# Patient Record
Sex: FEMALE | Race: OTHER | HISPANIC OR LATINO | URBAN - METROPOLITAN AREA
[De-identification: names, ages, dates, MRNs, and addresses within clinical notes are randomized per-mention and may not be internally consistent; named-entity substitution may affect disease eponyms.]

---

## 2017-09-28 ENCOUNTER — EMERGENCY (EMERGENCY)
Facility: HOSPITAL | Age: 32
LOS: 1 days | Discharge: HOME | End: 2017-09-28
Attending: EMERGENCY MEDICINE | Admitting: EMERGENCY MEDICINE
Payer: SELF-PAY

## 2017-09-28 VITALS
RESPIRATION RATE: 18 BRPM | DIASTOLIC BLOOD PRESSURE: 78 MMHG | OXYGEN SATURATION: 97 % | TEMPERATURE: 99 F | HEART RATE: 77 BPM | SYSTOLIC BLOOD PRESSURE: 110 MMHG

## 2017-09-28 VITALS
RESPIRATION RATE: 16 BRPM | HEART RATE: 89 BPM | TEMPERATURE: 99 F | HEIGHT: 61 IN | SYSTOLIC BLOOD PRESSURE: 113 MMHG | OXYGEN SATURATION: 98 % | DIASTOLIC BLOOD PRESSURE: 74 MMHG | WEIGHT: 149.91 LBS

## 2017-09-28 DIAGNOSIS — N64.4 MASTODYNIA: ICD-10-CM

## 2017-09-28 DIAGNOSIS — Z79.899 OTHER LONG TERM (CURRENT) DRUG THERAPY: ICD-10-CM

## 2017-09-28 DIAGNOSIS — Z79.891 LONG TERM (CURRENT) USE OF OPIATE ANALGESIC: ICD-10-CM

## 2017-09-28 DIAGNOSIS — R50.9 FEVER, UNSPECIFIED: ICD-10-CM

## 2017-09-28 LAB
ALBUMIN SERPL ELPH-MCNC: 4.9 G/DL — SIGNIFICANT CHANGE UP (ref 3.3–5)
ALP SERPL-CCNC: 93 U/L — SIGNIFICANT CHANGE UP (ref 40–120)
ALT FLD-CCNC: 39 U/L — SIGNIFICANT CHANGE UP (ref 10–45)
ANION GAP SERPL CALC-SCNC: 15 MMOL/L — SIGNIFICANT CHANGE UP (ref 5–17)
AST SERPL-CCNC: 35 U/L — SIGNIFICANT CHANGE UP (ref 10–40)
BASOPHILS NFR BLD AUTO: 0.4 % — SIGNIFICANT CHANGE UP (ref 0–2)
BILIRUB SERPL-MCNC: 0.3 MG/DL — SIGNIFICANT CHANGE UP (ref 0.2–1.2)
BUN SERPL-MCNC: 11 MG/DL — SIGNIFICANT CHANGE UP (ref 7–23)
CALCIUM SERPL-MCNC: 10 MG/DL — SIGNIFICANT CHANGE UP (ref 8.4–10.5)
CHLORIDE SERPL-SCNC: 101 MMOL/L — SIGNIFICANT CHANGE UP (ref 96–108)
CO2 SERPL-SCNC: 23 MMOL/L — SIGNIFICANT CHANGE UP (ref 22–31)
CREAT SERPL-MCNC: 0.57 MG/DL — SIGNIFICANT CHANGE UP (ref 0.5–1.3)
EOSINOPHIL NFR BLD AUTO: 1.1 % — SIGNIFICANT CHANGE UP (ref 0–6)
GLUCOSE SERPL-MCNC: 127 MG/DL — HIGH (ref 70–99)
HCT VFR BLD CALC: 44.6 % — SIGNIFICANT CHANGE UP (ref 34.5–45)
HGB BLD-MCNC: 14.7 G/DL — SIGNIFICANT CHANGE UP (ref 11.5–15.5)
LYMPHOCYTES # BLD AUTO: 27.2 % — SIGNIFICANT CHANGE UP (ref 13–44)
MCHC RBC-ENTMCNC: 28.3 PG — SIGNIFICANT CHANGE UP (ref 27–34)
MCHC RBC-ENTMCNC: 33 G/DL — SIGNIFICANT CHANGE UP (ref 32–36)
MCV RBC AUTO: 85.8 FL — SIGNIFICANT CHANGE UP (ref 80–100)
MONOCYTES NFR BLD AUTO: 6.2 % — SIGNIFICANT CHANGE UP (ref 2–14)
NEUTROPHILS NFR BLD AUTO: 65.1 % — SIGNIFICANT CHANGE UP (ref 43–77)
PLATELET # BLD AUTO: 354 K/UL — SIGNIFICANT CHANGE UP (ref 150–400)
POTASSIUM SERPL-MCNC: 4 MMOL/L — SIGNIFICANT CHANGE UP (ref 3.5–5.3)
POTASSIUM SERPL-SCNC: 4 MMOL/L — SIGNIFICANT CHANGE UP (ref 3.5–5.3)
PROT SERPL-MCNC: 7.9 G/DL — SIGNIFICANT CHANGE UP (ref 6–8.3)
RBC # BLD: 5.2 M/UL — SIGNIFICANT CHANGE UP (ref 3.8–5.2)
RBC # FLD: 12.9 % — SIGNIFICANT CHANGE UP (ref 10.3–16.9)
SODIUM SERPL-SCNC: 139 MMOL/L — SIGNIFICANT CHANGE UP (ref 135–145)
WBC # BLD: 5.7 K/UL — SIGNIFICANT CHANGE UP (ref 3.8–10.5)
WBC # FLD AUTO: 5.7 K/UL — SIGNIFICANT CHANGE UP (ref 3.8–10.5)

## 2017-09-28 PROCEDURE — 80053 COMPREHEN METABOLIC PANEL: CPT

## 2017-09-28 PROCEDURE — 87040 BLOOD CULTURE FOR BACTERIA: CPT

## 2017-09-28 PROCEDURE — 85025 COMPLETE CBC W/AUTO DIFF WBC: CPT

## 2017-09-28 PROCEDURE — 76641 ULTRASOUND BREAST COMPLETE: CPT | Mod: 26,LT

## 2017-09-28 PROCEDURE — 36415 COLL VENOUS BLD VENIPUNCTURE: CPT

## 2017-09-28 PROCEDURE — 96374 THER/PROPH/DIAG INJ IV PUSH: CPT

## 2017-09-28 PROCEDURE — 76641 ULTRASOUND BREAST COMPLETE: CPT

## 2017-09-28 PROCEDURE — 99284 EMERGENCY DEPT VISIT MOD MDM: CPT | Mod: 25

## 2017-09-28 PROCEDURE — 99285 EMERGENCY DEPT VISIT HI MDM: CPT

## 2017-09-28 RX ORDER — CEPHALEXIN 500 MG
500 CAPSULE ORAL ONCE
Qty: 0 | Refills: 0 | Status: COMPLETED | OUTPATIENT
Start: 2017-09-28 | End: 2017-09-28

## 2017-09-28 RX ORDER — ACETAMINOPHEN WITH CODEINE 300MG-30MG
1 TABLET ORAL
Qty: 6 | Refills: 0
Start: 2017-09-28 | End: 2017-10-04

## 2017-09-28 RX ORDER — CEPHALEXIN 500 MG
1 CAPSULE ORAL
Qty: 28 | Refills: 0 | OUTPATIENT
Start: 2017-09-28 | End: 2017-10-05

## 2017-09-28 RX ORDER — SODIUM CHLORIDE 9 MG/ML
500 INJECTION INTRAMUSCULAR; INTRAVENOUS; SUBCUTANEOUS ONCE
Qty: 0 | Refills: 0 | Status: COMPLETED | OUTPATIENT
Start: 2017-09-28 | End: 2017-09-28

## 2017-09-28 RX ORDER — ACETAMINOPHEN 500 MG
650 TABLET ORAL ONCE
Qty: 0 | Refills: 0 | Status: COMPLETED | OUTPATIENT
Start: 2017-09-28 | End: 2017-09-28

## 2017-09-28 RX ORDER — KETOROLAC TROMETHAMINE 30 MG/ML
30 SYRINGE (ML) INJECTION ONCE
Qty: 0 | Refills: 0 | Status: DISCONTINUED | OUTPATIENT
Start: 2017-09-28 | End: 2017-09-28

## 2017-09-28 RX ADMIN — Medication 30 MILLIGRAM(S): at 15:39

## 2017-09-28 RX ADMIN — SODIUM CHLORIDE 1000 MILLILITER(S): 9 INJECTION INTRAMUSCULAR; INTRAVENOUS; SUBCUTANEOUS at 15:08

## 2017-09-28 RX ADMIN — Medication 650 MILLIGRAM(S): at 16:02

## 2017-09-28 RX ADMIN — Medication 30 MILLIGRAM(S): at 15:09

## 2017-09-28 RX ADMIN — Medication 650 MILLIGRAM(S): at 15:32

## 2017-09-28 RX ADMIN — Medication 500 MILLIGRAM(S): at 18:26

## 2017-09-28 NOTE — ED PROVIDER NOTE - OBJECTIVE STATEMENT
33 yo female in the ER with left breast pain, slight localized redness. Pt mentioned that she felt lump in her breast few days ago, and that area became more and more painful. Pt also mentioned that yesterday she had slight pus drainage from that pimple? and she had fever yesterday and today. pt is concerned because breast is painful as well as she has pain now under her left arm. Pt denies discharge from her nipples, denies any prior infection or pain in her breasts.

## 2017-09-28 NOTE — ED ADULT TRIAGE NOTE - ARRIVAL INFO ADDITIONAL COMMENTS
Prior tx Motrin effective for fever. Left breast near nipple has a raised lump and small amount of redness noted. Denies any numbness or tingling.

## 2017-09-28 NOTE — ED ADULT NURSE NOTE - OBJECTIVE STATEMENT
c/o left breast pain with redness to site, under right nipple. Pt also reported having fever since last night and this morning. Pt noted purulent drain to site.

## 2017-09-28 NOTE — ED PROVIDER NOTE - SKIN, MLM
Skin normal color for race, warm, dry and intact. No evidence of rash.  small localized erythema and tenderness to the left breast, abnbout 4 o'clock, no palpable fluctuance or fluid collection, no drainage

## 2017-09-28 NOTE — ED PROVIDER NOTE - ATTENDING CONTRIBUTION TO CARE
33 yo with erythema to L breast, no drainage, had subjective fever over the past few days. no cp/sob, hx of malignancy. Agree w PA exam as above including NAD, RRR, CTAB, no abdominal tenderness, no focal neuro deficits. US reviewed, appearing clinically as mastitis vs cellulitis, given subjective fever, will cover with PO abx, strict return prec given.

## 2017-09-28 NOTE — ED PROVIDER NOTE - MEDICAL DECISION MAKING DETAILS
33 yo female with pain to her left breast, slight localized erythema and superficial induration, no drainage,had fever yesterday and today. Labs- no leukocytosis, breast sono- no abscess, no mass, no cysts. D/c home with PO abx and put pt f/u with breast specialist within a week, most likely repeat US.

## 2017-10-03 LAB
CULTURE RESULTS: SIGNIFICANT CHANGE UP
CULTURE RESULTS: SIGNIFICANT CHANGE UP
SPECIMEN SOURCE: SIGNIFICANT CHANGE UP
SPECIMEN SOURCE: SIGNIFICANT CHANGE UP

## 2017-11-01 ENCOUNTER — INPATIENT (INPATIENT)
Facility: HOSPITAL | Age: 32
LOS: 0 days | Discharge: HOME | DRG: 941 | End: 2017-11-02
Attending: OBSTETRICS & GYNECOLOGY | Admitting: OBSTETRICS & GYNECOLOGY
Payer: MEDICAID

## 2017-11-01 VITALS
OXYGEN SATURATION: 98 % | TEMPERATURE: 99 F | HEART RATE: 87 BPM | DIASTOLIC BLOOD PRESSURE: 74 MMHG | WEIGHT: 149.91 LBS | SYSTOLIC BLOOD PRESSURE: 119 MMHG | RESPIRATION RATE: 16 BRPM

## 2017-11-01 LAB
ALBUMIN SERPL ELPH-MCNC: 4.2 G/DL — SIGNIFICANT CHANGE UP (ref 3.3–5)
ALP SERPL-CCNC: 120 U/L — SIGNIFICANT CHANGE UP (ref 40–120)
ALT FLD-CCNC: 83 U/L — HIGH (ref 10–45)
ANION GAP SERPL CALC-SCNC: 13 MMOL/L — SIGNIFICANT CHANGE UP (ref 5–17)
APPEARANCE UR: (no result)
AST SERPL-CCNC: 86 U/L — HIGH (ref 10–40)
BASOPHILS NFR BLD AUTO: 0.3 % — SIGNIFICANT CHANGE UP (ref 0–2)
BILIRUB SERPL-MCNC: 0.3 MG/DL — SIGNIFICANT CHANGE UP (ref 0.2–1.2)
BILIRUB UR-MCNC: NEGATIVE — SIGNIFICANT CHANGE UP
BUN SERPL-MCNC: 4 MG/DL — LOW (ref 7–23)
CALCIUM SERPL-MCNC: 9.1 MG/DL — SIGNIFICANT CHANGE UP (ref 8.4–10.5)
CHLORIDE SERPL-SCNC: 102 MMOL/L — SIGNIFICANT CHANGE UP (ref 96–108)
CO2 SERPL-SCNC: 24 MMOL/L — SIGNIFICANT CHANGE UP (ref 22–31)
COLOR SPEC: YELLOW — SIGNIFICANT CHANGE UP
CREAT SERPL-MCNC: 0.52 MG/DL — SIGNIFICANT CHANGE UP (ref 0.5–1.3)
DIFF PNL FLD: NEGATIVE — SIGNIFICANT CHANGE UP
EOSINOPHIL NFR BLD AUTO: 0.7 % — SIGNIFICANT CHANGE UP (ref 0–6)
GLUCOSE SERPL-MCNC: 102 MG/DL — HIGH (ref 70–99)
GLUCOSE UR QL: NEGATIVE — SIGNIFICANT CHANGE UP
HCT VFR BLD CALC: 38.8 % — SIGNIFICANT CHANGE UP (ref 34.5–45)
HGB BLD-MCNC: 13.5 G/DL — SIGNIFICANT CHANGE UP (ref 11.5–15.5)
KETONES UR-MCNC: NEGATIVE — SIGNIFICANT CHANGE UP
LEUKOCYTE ESTERASE UR-ACNC: NEGATIVE — SIGNIFICANT CHANGE UP
LYMPHOCYTES # BLD AUTO: 25.1 % — SIGNIFICANT CHANGE UP (ref 13–44)
MCHC RBC-ENTMCNC: 29.5 PG — SIGNIFICANT CHANGE UP (ref 27–34)
MCHC RBC-ENTMCNC: 34.8 G/DL — SIGNIFICANT CHANGE UP (ref 32–36)
MCV RBC AUTO: 84.7 FL — SIGNIFICANT CHANGE UP (ref 80–100)
MONOCYTES NFR BLD AUTO: 5.7 % — SIGNIFICANT CHANGE UP (ref 2–14)
NEUTROPHILS NFR BLD AUTO: 68.2 % — SIGNIFICANT CHANGE UP (ref 43–77)
NITRITE UR-MCNC: NEGATIVE — SIGNIFICANT CHANGE UP
PH UR: 7.5 — SIGNIFICANT CHANGE UP (ref 5–8)
PLATELET # BLD AUTO: 298 K/UL — SIGNIFICANT CHANGE UP (ref 150–400)
POTASSIUM SERPL-MCNC: 3.8 MMOL/L — SIGNIFICANT CHANGE UP (ref 3.5–5.3)
POTASSIUM SERPL-SCNC: 3.8 MMOL/L — SIGNIFICANT CHANGE UP (ref 3.5–5.3)
PROT SERPL-MCNC: 7.5 G/DL — SIGNIFICANT CHANGE UP (ref 6–8.3)
PROT UR-MCNC: NEGATIVE MG/DL — SIGNIFICANT CHANGE UP
RBC # BLD: 4.58 M/UL — SIGNIFICANT CHANGE UP (ref 3.8–5.2)
RBC # FLD: 12.8 % — SIGNIFICANT CHANGE UP (ref 10.3–16.9)
SODIUM SERPL-SCNC: 139 MMOL/L — SIGNIFICANT CHANGE UP (ref 135–145)
SP GR SPEC: 1.01 — SIGNIFICANT CHANGE UP (ref 1–1.03)
UROBILINOGEN FLD QL: 0.2 E.U./DL — SIGNIFICANT CHANGE UP
WBC # BLD: 6.9 K/UL — SIGNIFICANT CHANGE UP (ref 3.8–10.5)
WBC # FLD AUTO: 6.9 K/UL — SIGNIFICANT CHANGE UP (ref 3.8–10.5)

## 2017-11-01 PROCEDURE — 99285 EMERGENCY DEPT VISIT HI MDM: CPT

## 2017-11-01 PROCEDURE — 76817 TRANSVAGINAL US OBSTETRIC: CPT | Mod: 26

## 2017-11-01 PROCEDURE — 76770 US EXAM ABDO BACK WALL COMP: CPT | Mod: 26

## 2017-11-01 PROCEDURE — 76801 OB US < 14 WKS SINGLE FETUS: CPT | Mod: 26

## 2017-11-01 RX ORDER — ONDANSETRON 8 MG/1
4 TABLET, FILM COATED ORAL ONCE
Qty: 0 | Refills: 0 | Status: DISCONTINUED | OUTPATIENT
Start: 2017-11-01 | End: 2017-11-01

## 2017-11-01 RX ORDER — ACETAMINOPHEN 500 MG
650 TABLET ORAL ONCE
Qty: 0 | Refills: 0 | Status: COMPLETED | OUTPATIENT
Start: 2017-11-01 | End: 2017-11-01

## 2017-11-01 RX ORDER — SODIUM CHLORIDE 9 MG/ML
1000 INJECTION INTRAMUSCULAR; INTRAVENOUS; SUBCUTANEOUS ONCE
Qty: 0 | Refills: 0 | Status: COMPLETED | OUTPATIENT
Start: 2017-11-01 | End: 2017-11-01

## 2017-11-01 RX ORDER — MORPHINE SULFATE 50 MG/1
2 CAPSULE, EXTENDED RELEASE ORAL ONCE
Qty: 0 | Refills: 0 | Status: DISCONTINUED | OUTPATIENT
Start: 2017-11-01 | End: 2017-11-01

## 2017-11-01 RX ADMIN — SODIUM CHLORIDE 2000 MILLILITER(S): 9 INJECTION INTRAMUSCULAR; INTRAVENOUS; SUBCUTANEOUS at 20:02

## 2017-11-01 RX ADMIN — SODIUM CHLORIDE 2000 MILLILITER(S): 9 INJECTION INTRAMUSCULAR; INTRAVENOUS; SUBCUTANEOUS at 17:22

## 2017-11-01 RX ADMIN — Medication 650 MILLIGRAM(S): at 18:00

## 2017-11-01 RX ADMIN — MORPHINE SULFATE 2 MILLIGRAM(S): 50 CAPSULE, EXTENDED RELEASE ORAL at 23:59

## 2017-11-01 RX ADMIN — Medication 650 MILLIGRAM(S): at 17:22

## 2017-11-01 RX ADMIN — MORPHINE SULFATE 2 MILLIGRAM(S): 50 CAPSULE, EXTENDED RELEASE ORAL at 21:52

## 2017-11-01 NOTE — ED ADULT NURSE NOTE - OBJECTIVE STATEMENT
pt presents to ED A&Ox3 c/o LUQ pain radiating to left flank and LLQ starting Sunday. pt also reports fever 102 yesterday and vaginal spotting x few days. denies cp/sob, n/v/d, back pain, headache, cough, dysuria, burning urination. LMP 9/19/17 per pt states "my period is usually late."

## 2017-11-01 NOTE — ED PROVIDER NOTE - ATTENDING CONTRIBUTION TO CARE
32 F w LLQ pain/LUQ pain- since Sunday- fever 102 at home yesterday  + HCG - has irreg periods  vss  s1s2 lungs cta bl  abd soft + L sided ttp  IMP- Abd Pain  + pregnancy- LMP- Sep 19th  labs, US RO ectopic

## 2017-11-01 NOTE — ED PROVIDER NOTE - OBJECTIVE STATEMENT
33 yo female in the ER with left side abdominal pain, nausea, fever and chills. Symptoms started 3-4 days ago. Pain radiate to her flank. Pt reports that she is feeling worse today. H/o irregular menstruation, h/o left oophorectomy and left ovarian cyst in the past. LMP- 09/17-?

## 2017-11-01 NOTE — CONSULT NOTE ADULT - ASSESSMENT
31 yo  @6w3d by LMP  presents with LLQ and LUQ pain and newly diagnosed positive pregnancy.  No EP or IUP detected on sonogram however bHCG is below discriminatory zone; may reflect early IUP vs ectopic pregnancy.  Pt's only significant risk factor is her history of prior left oophorectomy, unclear whether tube was involved.  Given pt's complaint of significant pain will continue to monitor after IV analgesia.  If pain resolves would be reasonable to repeat bHCG in 48 hours to trend.  Will keep NPO during evaluation.

## 2017-11-01 NOTE — CONSULT NOTE ADULT - SUBJECTIVE AND OBJECTIVE BOX
31 yo  @6w1d by LMP presents to ED with LLQ pain.  c/o 10/10 pain in LLQ and LUQ starting on .  Pain has been constant, worse with walking and breathing, improved only in certain positions.  Starts in L abdomen and radiates up to rib.  This morning also noted 102 fever on home thermometer, took Motrin and noted improvement of fever but not of pain.  She had an episode of vaginal bleeding on Friday but has not had any spotting or bleeding since.  She denies nausea/vomiting, no GI/ complaints.      Pt denies fever, chills, chest pain, SOB, abdominal pain, nausea, vomiting, vaginal bleeding      OBHx:  x2  GYN Hx: denies fibroids/abnormal paps/STIs  PMHx: denies  SHx: l/s L oophorectomy for large ovarian cyst   Meds: Flexeril (for back pain)  Allergies: NKDA    PHYSICAL EXAM:   Vital Signs Last 24 Hrs  T(C): 37.4 (2017 17:32), Max: 37.4 (2017 17:32)  T(F): 99.3 (2017 17:32), Max: 99.3 (2017 17:32)  HR: 80 (2017 17:32) (80 - 87)  BP: 103/66 (2017 17:32) (103/66 - 119/74)  BP(mean): --  RR: 18 (2017 17:32) (16 - 18)  SpO2: 98% (2017 17:32) (98% - 98%)    **************************  Constitutional: Alert & Oriented x3, No acute distress  Respiratory: Clear to ausculation bilaterally; no wheezing, rhonchi, or crackles  Cardiovascular: regular rate and rhythm, no murmurs, or gallops  Gastrointestinal: soft, non tender, positive bowel sounds, no rebound or guarding. +TTP in LLQ and LUQ.   Pelvic exam:   Extremities: no calf tenderness or swelling      LABS:                        13.5   6.9   )-----------( 298      ( 2017 16:40 )             38.8         139  |  102  |  4<L>  ----------------------------<  102<H>  3.8   |  24  |  0.52    Ca    9.1      2017 16:40    TPro  7.5  /  Alb  4.2  /  TBili  0.3  /  DBili  x   /  AST  86<H>  /  ALT  83<H>  /  AlkPhos  120        Urinalysis Basic - ( 2017 16:22 )    Color: Yellow / Appearance: SL Cloudy / S.015 / pH: x  Gluc: x / Ketone: NEGATIVE  / Bili: Negative / Urobili: 0.2 E.U./dL   Blood: x / Protein: NEGATIVE mg/dL / Nitrite: NEGATIVE   Leuk Esterase: NEGATIVE / RBC: x / WBC x   Sq Epi: x / Non Sq Epi: x / Bacteria: x      HCG Quantitative, Serum: 222.2 mIU/mL ( @ 16:40)      RADIOLOGY & ADDITIONAL STUDIES:    < from: US Echo Transvaginal, OB (17 @ 18:27) >  XAM:  US OB LES THAN 14 WKS 1ST GEST                          EXAM:  US OB TRANSVAGINAL                          PROCEDURE DATE:  2017                     INTERPRETATION:  OBSTETRICAL ULTRASOUND - FIRST TRIMESTER dated 2017   6:09 PM    INDICATION: Left lower quadrant pain, positive urine beta hCG, fever..   LMP: 2017. Beta hCG unavailable at time of dictation.    TECHNIQUE: Transabdominal views of the pelvis were obtained followed by   transvaginal views for better visualization of the endometrial cavity.      PRIOR STUDIES: None    FINDINGS:   By dates, the estimated gestational age is 6 weeks and 1 day.    A intrauterine gestation is not visualized.  A yolk sac is not visualized.  The cervix is closed with a length of 2.6 cm.    The uterus is anteverted. The uterus is 11.3 x 3.8 x 6.3 cm.  No   myometrial abnormalities are seen. The endometrium measures 1.2 cm. Trace   amount of fluid within the endometrial cavity.    The right ovary is normal in size, measuring 4.0 x 2.8 x 2.2 cm. No right   ovarian masses are seen. Patient is status post left oophorectomy.   Doppler evaluation demonstrates flow to both ovaries with no evidence of   torsion. Trace free fluid in the cul-de-sac.      IMPRESSION:   Trace amount of fluid within the endometrial cavity. No intrauterine or   extrauterine gestation visualized. In a patient with a positive pregnancy   test, these findings are consistent with a pregnancy of unknown location.   The differential diagnosis includes earlyintrauterine gestation,   nonvisualized ectopic gestation, or spontaneous . Correlation   with serial beta hCG levels every 2 days, and a follow-up ultrasound in   5-7 days, or earlier if it becomes clinically indicated, is recommended   for further evaluation.              "Thank you for the opportunity to participate in the care of this   patient."    MARICRUZ POOL M.D., RADIOLOGY RESIDENT  This document has been electronically signed.  NICOLE LEZAMA M.D., ATTENDING RADIOLOGIST    < end of copied text >    < from: US Retroperitoneal Complete (17 @ 17:52) >  NTERPRETATION:  RENAL and BLADDER ULTRASOUND dated 2017 5:52 PM    Indication: Left flank pain. Positive urine pregnancy test. Fever.    Prior studies: None    Findings:    RIGHT KIDNEY:  Length: 10.9 cm  Lesions: None  Hydronephrosis: None  Stones: None  Echogenicity: Normal    LEFT KIDNEY:  Length: 10.2 cm  Lesions: None  Hydronephrosis: None  Stones: None  Echogenicity: Normal    URINARY BLADDER:  Ureteral jets: Both visible.  Filling defects: None  Bladder volume: Pre-void: 210 ml; Post-void: 18 ml.      IMPRESSION: Unremarkable study.            "Thank you for the opportunity to participate in the care of this   patient."    MARICRUZ POOL M.D., RADIOLOGY RESIDENT  This document has been electronically signed.  NICOLE LEZAMA M.D., ATTENDING RADIOLOGIST  This document has been electronically signed. 2017  7:43PM    < end of copied text > 31 yo  @6w1d by LMP presents to ED with LLQ pain.  c/o 10/10 pain in LLQ and LUQ starting on .  Pain has been constant, worse with walking and breathing, improved only in certain positions.  Starts in L abdomen and radiates up to rib.  This morning also noted 102 fever on home thermometer, took Motrin and noted improvement of fever but not of pain.  She had an episode of vaginal bleeding on Friday but has not had any spotting or bleeding since.  She denies nausea/vomiting, no GI/ complaints.  No diarrhea or constipation.  Last BM this morning.      Pt denies fever, chills, chest pain, SOB, abdominal pain, nausea, vomiting, vaginal bleeding.      OBHx:  x2  GYN Hx: denies fibroids/abnormal paps/STIs  PMHx: denies  SHx: l/s L oophorectomy for large ovarian cyst   Meds: Flexeril (for back pain)  Allergies: NKDA    PHYSICAL EXAM:   Vital Signs Last 24 Hrs  T(C): 37.4 (2017 17:32), Max: 37.4 (2017 17:32)  T(F): 99.3 (2017 17:32), Max: 99.3 (2017 17:32)  HR: 80 (2017 17:32) (80 - 87)  BP: 103/66 (2017 17:32) (103/66 - 119/74)  BP(mean): --  RR: 18 (2017 17:32) (16 - 18)  SpO2: 98% (2017 17:32) (98% - 98%)    **************************  Constitutional: Alert & Oriented x3, No acute distress  Respiratory: Clear to ausculation bilaterally; no wheezing, rhonchi, or crackles  Cardiovascular: regular rate and rhythm, no murmurs, or gallops  Gastrointestinal: soft, non tender, positive bowel sounds, no rebound or guarding. +TTP in LLQ and LUQ.   Pelvic exam: no vaginal bleeding, cervix closed, physiologic discharge.  +CMT, left adnexal tenderness, no masses palpated  Extremities: no calf tenderness or swelling      LABS:                        13.5   6.9   )-----------( 298      ( 2017 16:40 )             38.8         139  |  102  |  4<L>  ----------------------------<  102<H>  3.8   |  24  |  0.52    Ca    9.1      2017 16:40    TPro  7.5  /  Alb  4.2  /  TBili  0.3  /  DBili  x   /  AST  86<H>  /  ALT  83<H>  /  AlkPhos  120        Urinalysis Basic - ( 2017 16:22 )    Color: Yellow / Appearance: SL Cloudy / S.015 / pH: x  Gluc: x / Ketone: NEGATIVE  / Bili: Negative / Urobili: 0.2 E.U./dL   Blood: x / Protein: NEGATIVE mg/dL / Nitrite: NEGATIVE   Leuk Esterase: NEGATIVE / RBC: x / WBC x   Sq Epi: x / Non Sq Epi: x / Bacteria: x      HCG Quantitative, Serum: 222.2 mIU/mL ( @ 16:40)      RADIOLOGY & ADDITIONAL STUDIES:    < from: US Echo Transvaginal, OB (17 @ 18:27) >  XAM:  US OB LES THAN 14 WKS 1ST GEST                          EXAM:  US OB TRANSVAGINAL                          PROCEDURE DATE:  2017                     INTERPRETATION:  OBSTETRICAL ULTRASOUND - FIRST TRIMESTER dated 2017   6:09 PM    INDICATION: Left lower quadrant pain, positive urine beta hCG, fever..   LMP: 2017. Beta hCG unavailable at time of dictation.    TECHNIQUE: Transabdominal views of the pelvis were obtained followed by   transvaginal views for better visualization of the endometrial cavity.      PRIOR STUDIES: None    FINDINGS:   By dates, the estimated gestational age is 6 weeks and 1 day.    A intrauterine gestation is not visualized.  A yolk sac is not visualized.  The cervix is closed with a length of 2.6 cm.    The uterus is anteverted. The uterus is 11.3 x 3.8 x 6.3 cm.  No   myometrial abnormalities are seen. The endometrium measures 1.2 cm. Trace   amount of fluid within the endometrial cavity.    The right ovary is normal in size, measuring 4.0 x 2.8 x 2.2 cm. No right   ovarian masses are seen. Patient is status post left oophorectomy.   Doppler evaluation demonstrates flow to both ovaries with no evidence of   torsion. Trace free fluid in the cul-de-sac.      IMPRESSION:   Trace amount of fluid within the endometrial cavity. No intrauterine or   extrauterine gestation visualized. In a patient with a positive pregnancy   test, these findings are consistent with a pregnancy of unknown location.   The differential diagnosis includes earlyintrauterine gestation,   nonvisualized ectopic gestation, or spontaneous . Correlation   with serial beta hCG levels every 2 days, and a follow-up ultrasound in   5-7 days, or earlier if it becomes clinically indicated, is recommended   for further evaluation.              "Thank you for the opportunity to participate in the care of this   patient."    MARICRUZ POOL M.D., RADIOLOGY RESIDENT  This document has been electronically signed.  NICOLE LEZAMA M.D., ATTENDING RADIOLOGIST    < end of copied text >    < from: US Retroperitoneal Complete (17 @ 17:52) >  NTERPRETATION:  RENAL and BLADDER ULTRASOUND dated 2017 5:52 PM    Indication: Left flank pain. Positive urine pregnancy test. Fever.    Prior studies: None    Findings:    RIGHT KIDNEY:  Length: 10.9 cm  Lesions: None  Hydronephrosis: None  Stones: None  Echogenicity: Normal    LEFT KIDNEY:  Length: 10.2 cm  Lesions: None  Hydronephrosis: None  Stones: None  Echogenicity: Normal    URINARY BLADDER:  Ureteral jets: Both visible.  Filling defects: None  Bladder volume: Pre-void: 210 ml; Post-void: 18 ml.      IMPRESSION: Unremarkable study.            "Thank you for the opportunity to participate in the care of this   patient."    MARICRUZ POOL M.D., RADIOLOGY RESIDENT  This document has been electronically signed.  NICOLE LEZAMA M.D., ATTENDING RADIOLOGIST  This document has been electronically signed. 2017  7:43PM    < end of copied text >

## 2017-11-01 NOTE — ED PROVIDER NOTE - MEDICAL DECISION MAKING DETAILS
31 yo female, LMP , , UCG+, BHCG-222.2, with left side abdominal pain, left flank pain. fever, chills, nausea at home, VSS while in the ER, no leukocytosis, no abnormal findings on pelvic US.  pending GYN evaluation and disposition

## 2017-11-01 NOTE — ED ADULT NURSE NOTE - CHPI ED SYMPTOMS NEG
no diarrhea/no hematuria/no dysuria/no blood in stool/no vomiting/no burning urination/no abdominal distension/no nausea

## 2017-11-02 VITALS
SYSTOLIC BLOOD PRESSURE: 104 MMHG | HEART RATE: 90 BPM | RESPIRATION RATE: 16 BRPM | OXYGEN SATURATION: 97 % | DIASTOLIC BLOOD PRESSURE: 74 MMHG

## 2017-11-02 PROCEDURE — 86850 RBC ANTIBODY SCREEN: CPT

## 2017-11-02 PROCEDURE — 86901 BLOOD TYPING SEROLOGIC RH(D): CPT

## 2017-11-02 PROCEDURE — C9399: CPT

## 2017-11-02 PROCEDURE — 86900 BLOOD TYPING SEROLOGIC ABO: CPT

## 2017-11-02 PROCEDURE — 84702 CHORIONIC GONADOTROPIN TEST: CPT

## 2017-11-02 PROCEDURE — 99285 EMERGENCY DEPT VISIT HI MDM: CPT | Mod: 25

## 2017-11-02 PROCEDURE — 76817 TRANSVAGINAL US OBSTETRIC: CPT

## 2017-11-02 PROCEDURE — 85025 COMPLETE CBC W/AUTO DIFF WBC: CPT

## 2017-11-02 PROCEDURE — 36415 COLL VENOUS BLD VENIPUNCTURE: CPT

## 2017-11-02 PROCEDURE — 76801 OB US < 14 WKS SINGLE FETUS: CPT

## 2017-11-02 PROCEDURE — 96374 THER/PROPH/DIAG INJ IV PUSH: CPT

## 2017-11-02 PROCEDURE — 88305 TISSUE EXAM BY PATHOLOGIST: CPT

## 2017-11-02 PROCEDURE — 76770 US EXAM ABDO BACK WALL COMP: CPT

## 2017-11-02 PROCEDURE — 80053 COMPREHEN METABOLIC PANEL: CPT

## 2017-11-02 RX ORDER — KETOROLAC TROMETHAMINE 30 MG/ML
30 SYRINGE (ML) INJECTION ONCE
Qty: 0 | Refills: 0 | Status: DISCONTINUED | OUTPATIENT
Start: 2017-11-02 | End: 2017-11-02

## 2017-11-02 RX ORDER — MORPHINE SULFATE 50 MG/1
2 CAPSULE, EXTENDED RELEASE ORAL EVERY 4 HOURS
Qty: 0 | Refills: 0 | Status: DISCONTINUED | OUTPATIENT
Start: 2017-11-02 | End: 2017-11-02

## 2017-11-02 RX ORDER — ACETAMINOPHEN 500 MG
1000 TABLET ORAL ONCE
Qty: 0 | Refills: 0 | Status: COMPLETED | OUTPATIENT
Start: 2017-11-02 | End: 2017-11-02

## 2017-11-02 RX ORDER — IBUPROFEN 200 MG
1 TABLET ORAL
Qty: 15 | Refills: 0
Start: 2017-11-02

## 2017-11-02 RX ORDER — OXYCODONE AND ACETAMINOPHEN 5; 325 MG/1; MG/1
1 TABLET ORAL EVERY 4 HOURS
Qty: 0 | Refills: 0 | Status: DISCONTINUED | OUTPATIENT
Start: 2017-11-02 | End: 2017-11-02

## 2017-11-02 RX ORDER — SODIUM CHLORIDE 9 MG/ML
1000 INJECTION, SOLUTION INTRAVENOUS
Qty: 0 | Refills: 0 | Status: DISCONTINUED | OUTPATIENT
Start: 2017-11-02 | End: 2017-11-02

## 2017-11-02 RX ADMIN — OXYCODONE AND ACETAMINOPHEN 1 TABLET(S): 5; 325 TABLET ORAL at 06:29

## 2017-11-02 RX ADMIN — Medication 400 MILLIGRAM(S): at 02:01

## 2017-11-02 RX ADMIN — OXYCODONE AND ACETAMINOPHEN 1 TABLET(S): 5; 325 TABLET ORAL at 05:21

## 2017-11-02 RX ADMIN — Medication 30 MILLIGRAM(S): at 05:20

## 2017-11-02 RX ADMIN — Medication 30 MILLIGRAM(S): at 04:44

## 2017-11-02 RX ADMIN — MORPHINE SULFATE 2 MILLIGRAM(S): 50 CAPSULE, EXTENDED RELEASE ORAL at 02:00

## 2017-11-02 RX ADMIN — MORPHINE SULFATE 2 MILLIGRAM(S): 50 CAPSULE, EXTENDED RELEASE ORAL at 05:09

## 2017-11-02 RX ADMIN — SODIUM CHLORIDE 125 MILLILITER(S): 9 INJECTION, SOLUTION INTRAVENOUS at 01:18

## 2017-11-02 RX ADMIN — MORPHINE SULFATE 2 MILLIGRAM(S): 50 CAPSULE, EXTENDED RELEASE ORAL at 06:29

## 2017-11-02 RX ADMIN — MORPHINE SULFATE 2 MILLIGRAM(S): 50 CAPSULE, EXTENDED RELEASE ORAL at 01:17

## 2017-11-02 NOTE — PROGRESS NOTE ADULT - SUBJECTIVE AND OBJECTIVE BOX
Pt seen and examined with cardenas attending.  Pt reports that she has not felt any relief with IV morphine and feels that pain is in fact worsening at this time.  Now feels left sided rib pain with breathing as well.  Physical exam notable for continued tenderness to palpation in LLQ and LUQ, along with L abdominal pain with flexion of left hip.  Vitals continue to be stable, however in light of severe pain and risk factors cannot rule out ectopic pregnancy.  Will add on to OR as Class II for diagnostic laparoscopy.

## 2017-11-03 ENCOUNTER — EMERGENCY (EMERGENCY)
Facility: HOSPITAL | Age: 32
LOS: 1 days | Discharge: HOME | End: 2017-11-03
Attending: EMERGENCY MEDICINE | Admitting: EMERGENCY MEDICINE
Payer: MEDICAID

## 2017-11-03 VITALS
OXYGEN SATURATION: 99 % | HEART RATE: 79 BPM | DIASTOLIC BLOOD PRESSURE: 64 MMHG | RESPIRATION RATE: 18 BRPM | TEMPERATURE: 99 F | SYSTOLIC BLOOD PRESSURE: 100 MMHG

## 2017-11-03 VITALS
OXYGEN SATURATION: 98 % | RESPIRATION RATE: 18 BRPM | SYSTOLIC BLOOD PRESSURE: 115 MMHG | WEIGHT: 149.91 LBS | TEMPERATURE: 98 F | HEIGHT: 60 IN | DIASTOLIC BLOOD PRESSURE: 74 MMHG | HEART RATE: 91 BPM

## 2017-11-03 LAB
APPEARANCE UR: CLEAR — SIGNIFICANT CHANGE UP
BACTERIA # UR AUTO: PRESENT /HPF
BASOPHILS NFR BLD AUTO: 0.1 % — SIGNIFICANT CHANGE UP (ref 0–2)
BILIRUB UR-MCNC: NEGATIVE — SIGNIFICANT CHANGE UP
COLOR SPEC: YELLOW — SIGNIFICANT CHANGE UP
DIFF PNL FLD: (no result)
EOSINOPHIL NFR BLD AUTO: 0.6 % — SIGNIFICANT CHANGE UP (ref 0–6)
GLUCOSE UR QL: NEGATIVE — SIGNIFICANT CHANGE UP
HCG SERPL-ACNC: 36.6 MIU/ML — HIGH
HCT VFR BLD CALC: 34.4 % — LOW (ref 34.5–45)
HGB BLD-MCNC: 11.4 G/DL — LOW (ref 11.5–15.5)
KETONES UR-MCNC: NEGATIVE — SIGNIFICANT CHANGE UP
LEUKOCYTE ESTERASE UR-ACNC: NEGATIVE — SIGNIFICANT CHANGE UP
LYMPHOCYTES # BLD AUTO: 35.4 % — SIGNIFICANT CHANGE UP (ref 13–44)
MCHC RBC-ENTMCNC: 28.6 PG — SIGNIFICANT CHANGE UP (ref 27–34)
MCHC RBC-ENTMCNC: 33.1 G/DL — SIGNIFICANT CHANGE UP (ref 32–36)
MCV RBC AUTO: 86.2 FL — SIGNIFICANT CHANGE UP (ref 80–100)
MONOCYTES NFR BLD AUTO: 5.8 % — SIGNIFICANT CHANGE UP (ref 2–14)
NEUTROPHILS NFR BLD AUTO: 58.1 % — SIGNIFICANT CHANGE UP (ref 43–77)
NITRITE UR-MCNC: NEGATIVE — SIGNIFICANT CHANGE UP
PH UR: 6 — SIGNIFICANT CHANGE UP (ref 5–8)
PLATELET # BLD AUTO: 335 K/UL — SIGNIFICANT CHANGE UP (ref 150–400)
PROT UR-MCNC: NEGATIVE MG/DL — SIGNIFICANT CHANGE UP
RBC # BLD: 3.99 M/UL — SIGNIFICANT CHANGE UP (ref 3.8–5.2)
RBC # FLD: 12.8 % — SIGNIFICANT CHANGE UP (ref 10.3–16.9)
RBC CASTS # UR COMP ASSIST: < 5 /HPF — SIGNIFICANT CHANGE UP
SP GR SPEC: 1.02 — SIGNIFICANT CHANGE UP (ref 1–1.03)
SURGICAL PATHOLOGY STUDY: SIGNIFICANT CHANGE UP
UROBILINOGEN FLD QL: 0.2 E.U./DL — SIGNIFICANT CHANGE UP
WBC # BLD: 7.9 K/UL — SIGNIFICANT CHANGE UP (ref 3.8–10.5)
WBC # FLD AUTO: 7.9 K/UL — SIGNIFICANT CHANGE UP (ref 3.8–10.5)

## 2017-11-03 PROCEDURE — 80053 COMPREHEN METABOLIC PANEL: CPT

## 2017-11-03 PROCEDURE — 74177 CT ABD & PELVIS W/CONTRAST: CPT

## 2017-11-03 PROCEDURE — 96374 THER/PROPH/DIAG INJ IV PUSH: CPT | Mod: XU

## 2017-11-03 PROCEDURE — 81001 URINALYSIS AUTO W/SCOPE: CPT

## 2017-11-03 PROCEDURE — 99285 EMERGENCY DEPT VISIT HI MDM: CPT

## 2017-11-03 PROCEDURE — 84702 CHORIONIC GONADOTROPIN TEST: CPT

## 2017-11-03 PROCEDURE — 36415 COLL VENOUS BLD VENIPUNCTURE: CPT

## 2017-11-03 PROCEDURE — 85025 COMPLETE CBC W/AUTO DIFF WBC: CPT

## 2017-11-03 PROCEDURE — 99284 EMERGENCY DEPT VISIT MOD MDM: CPT | Mod: 25

## 2017-11-03 PROCEDURE — 96375 TX/PRO/DX INJ NEW DRUG ADDON: CPT | Mod: XU

## 2017-11-03 PROCEDURE — 74177 CT ABD & PELVIS W/CONTRAST: CPT | Mod: 26

## 2017-11-03 RX ORDER — KETOROLAC TROMETHAMINE 30 MG/ML
30 SYRINGE (ML) INJECTION ONCE
Qty: 0 | Refills: 0 | Status: DISCONTINUED | OUTPATIENT
Start: 2017-11-03 | End: 2017-11-03

## 2017-11-03 RX ORDER — MORPHINE SULFATE 50 MG/1
2 CAPSULE, EXTENDED RELEASE ORAL ONCE
Qty: 0 | Refills: 0 | Status: DISCONTINUED | OUTPATIENT
Start: 2017-11-03 | End: 2017-11-03

## 2017-11-03 RX ORDER — IOHEXOL 300 MG/ML
50 INJECTION, SOLUTION INTRAVENOUS ONCE
Qty: 0 | Refills: 0 | Status: COMPLETED | OUTPATIENT
Start: 2017-11-03 | End: 2017-11-03

## 2017-11-03 RX ORDER — SODIUM CHLORIDE 9 MG/ML
1000 INJECTION INTRAMUSCULAR; INTRAVENOUS; SUBCUTANEOUS ONCE
Qty: 0 | Refills: 0 | Status: COMPLETED | OUTPATIENT
Start: 2017-11-03 | End: 2017-11-03

## 2017-11-03 RX ADMIN — IOHEXOL 50 MILLILITER(S): 300 INJECTION, SOLUTION INTRAVENOUS at 19:59

## 2017-11-03 RX ADMIN — MORPHINE SULFATE 2 MILLIGRAM(S): 50 CAPSULE, EXTENDED RELEASE ORAL at 15:44

## 2017-11-03 RX ADMIN — Medication 30 MILLIGRAM(S): at 19:59

## 2017-11-03 RX ADMIN — SODIUM CHLORIDE 2000 MILLILITER(S): 9 INJECTION INTRAMUSCULAR; INTRAVENOUS; SUBCUTANEOUS at 19:55

## 2017-11-03 NOTE — CONSULT NOTE ADULT - ASSESSMENT
33yo  POD1 s/p diagnostic laparoscopy and D&C for severe LLQ pain in the setting of pregnancy of uknown location. beta hCG down trending appropriately after D&C suggesting aborting pregnancy. Unremarkable findings on Diagnostic laparoscopy, no Gyn source identifiable. Source of pain likely not Gyn related.  -consider repeat UA and Urine Cx given c/o dysuria yesterday.   -Gyn to sign off of patient. Feel free to re consult as needed.      Plan discussed with Dr. Coronado

## 2017-11-03 NOTE — ED ADULT NURSE REASSESSMENT NOTE - NS ED NURSE REASSESS COMMENT FT1
patient states abd pain persists despite pain med admin.  seen by GYN, awaiting orders from team.  as per ED attending no more pain medication to be given.

## 2017-11-03 NOTE — ED ADULT NURSE REASSESSMENT NOTE - NS ED NURSE REASSESS COMMENT FT1
Pt refuses final vitals and left ED after signing DC papers.  Pt is alert and oriented x3, ambulatory with even gait.

## 2017-11-03 NOTE — ED ADULT NURSE NOTE - OBJECTIVE STATEMENT
states LLQ abd pain.  s/p laparoscopic exploration for pregnancy but did not find anything. states she was told to come to repeat HCG.  states spotting has stopped.  abd tender to touch, +guarding.  denies n/v/d.

## 2017-11-03 NOTE — ED PROVIDER NOTE - OBJECTIVE STATEMENT
here for repeat hcg.  Was in the hospital for left sided abd pain, found to have pregnancy of unknown location, no iup seen on us.  Went for diagnostic lap and still unable to find pregnancy/ cause of pain.  States pain continues today.  Denies fever/chills.  Mild nausea and decreased appetite. Spotting resolved

## 2017-11-03 NOTE — CONSULT NOTE ADULT - SUBJECTIVE AND OBJECTIVE BOX
33yo  POD1 s/p diagnostic laparoscopy and D&C for severe LLQ pain in the setting of pregnancy of unknown location. Patient presented to ED on  for LLQ pain that began on Gildardo 10/29. Pt found to have BhCG of 222 on . TVUW showed trace fluid in endometrial cavity but no signs of IUP or EUP. Patient was taken to the OR for diagnostic laparoscopy and D&C due to severity of patients pain on . Patient returns today for repeat bHCG and for continue severe LLQ pain.  She reports fever of 102F on Monday but afebrile since. She notes pain is worsened by movement and deep breaths. She reports vaginal spotting. She notes dysuria yesterday that is improving and constipation since yesterday. She denies fever, chills, nausea, vomiting, diarrhea.       Pt received IV morphine 2mg in ED without relief of pain    GynHx:  -LMP: 17  -denies h/o STIs  -h/o ovarian cysts s/p left oophorectomy  OBHX:    x2  G3: current now s/p D&C  PMH: denies  PSH: L/S left oophorectomy, diagnostic L/S with D&C 17  Meds: percocet, motrin  All: NKDA    Vital Signs Last 24 Hrs  T(C): 37.2 (2017 15:14), Max: 37.2 (2017 15:14)  T(F): 98.9 (2017 15:14), Max: 98.9 (2017 15:14)  HR: 79 (2017 15:14) (79 - 91)  BP: 100/64 (2017 15:14) (100/64 - 115/74)  BP(mean): --  RR: 18 (2017 15:14) (18 - 18)  SpO2: 99% (2017 15:14) (98% - 99%)  Gen: moderatley uncomfortable due to pain  Card: RRR no m/r/g  Pulm: CTAB no crackles or wheezes  Abd: +BS, soft, tender to palpation of lower quadrants left greater than middle and right. No rebound, no guarding. trochar bandages C/D/I  : normal appearing external genitalia. on speculum exam no vaginal bleeding visualized, cervix appears long and closed. on bimanual exam, mild tenderness to palpation of left adnexa, no tenderness of right adnexa or cervix      [facility] 11/3/17: 33yo  POD1 s/p diagnostic laparoscopy and D&C for severe LLQ pain in the setting of pregnancy of unknown location. Patient presented to ED on  for LLQ pain that began on Gildardo 10/29. Pt found to have BhCG of 222 on . TVUW showed trace fluid in endometrial cavity but no signs of IUP or EUP. Patient was taken to the OR for diagnostic laparoscopy and D&C due to severity of patients pain on . Patient returns today for repeat bHCG and for continue severe LLQ pain.  She reports fever of 102F on Monday but afebrile since. She notes pain is worsened by movement and deep breaths. She reports vaginal spotting. She notes dysuria yesterday that is improving and constipation since yesterday. She denies fever, chills, nausea, vomiting, diarrhea.       Pt received IV morphine 2mg in ED without relief of pain    GynHx:  -LMP: 17  -denies h/o STIs  -h/o ovarian cysts s/p left oophorectomy  OBHX:    x2  G3: current now s/p D&C  PMH: denies  PSH: L/S left oophorectomy, diagnostic L/S with D&C 17  Meds: percocet, motrin  All: NKDA    Vital Signs Last 24 Hrs  T(C): 37.2 (2017 15:14), Max: 37.2 (2017 15:14)  T(F): 98.9 (2017 15:14), Max: 98.9 (2017 15:14)  HR: 79 (2017 15:14) (79 - 91)  BP: 100/64 (2017 15:14) (100/64 - 115/74)  BP(mean): --  RR: 18 (2017 15:14) (18 - 18)  SpO2: 99% (2017 15:14) (98% - 99%)  Gen: moderatley uncomfortable due to pain  Card: RRR no m/r/g  Pulm: CTAB no crackles or wheezes  Abd: +BS, soft, tender to palpation of lower quadrants left greater than middle and right. No rebound, no guarding. trochar bandages C/D/I  : normal appearing external genitalia. on speculum exam no vaginal bleeding visualized, cervix appears long and closed. on bimanual exam, mild tenderness to palpation of left adnexa, no tenderness of right adnexa or cervix  Back: No CVA tenderness bilaterally      [facility] [place] 11/3/17:

## 2017-11-03 NOTE — ED ADULT TRIAGE NOTE - CHIEF COMPLAINT QUOTE
"I had laparoscopic surgery yesterday. I'm pregnant and they couldn't find a viable pregnancy or see where my pain is coming from. They told me to come back today to recheck the Hcg level."

## 2017-11-03 NOTE — ED PROVIDER NOTE - MEDICAL DECISION MAKING DETAILS
returns for repeat hcg after recent admit for diagnostic lap and d and c.  hcg downtrending.  notes persistent left sided pain.  seen by gyn in the ed who did not feel that pain explained by recent procedure or gyn cause.  remains afebrile without s/s of infection.  no leukocytosis.  decision made to ct to r/o other pathology and also neg.  unclear etiology of pain.  will continue outpatient pain management with motrin/percocet already prescribed, f/u in clinic

## 2017-11-09 DIAGNOSIS — R10.9 UNSPECIFIED ABDOMINAL PAIN: ICD-10-CM

## 2017-11-09 DIAGNOSIS — Z98.890 OTHER SPECIFIED POSTPROCEDURAL STATES: ICD-10-CM

## 2017-11-09 DIAGNOSIS — Z79.899 OTHER LONG TERM (CURRENT) DRUG THERAPY: ICD-10-CM

## 2017-11-09 DIAGNOSIS — Z79.2 LONG TERM (CURRENT) USE OF ANTIBIOTICS: ICD-10-CM

## 2017-11-09 DIAGNOSIS — Z79.1 LONG TERM (CURRENT) USE OF NON-STEROIDAL ANTI-INFLAMMATORIES (NSAID): ICD-10-CM

## 2017-11-09 DIAGNOSIS — Z79.891 LONG TERM (CURRENT) USE OF OPIATE ANALGESIC: ICD-10-CM

## 2017-11-14 ENCOUNTER — EMERGENCY (EMERGENCY)
Facility: HOSPITAL | Age: 32
LOS: 1 days | Discharge: HOME | End: 2017-11-14
Attending: EMERGENCY MEDICINE | Admitting: EMERGENCY MEDICINE
Payer: MEDICAID

## 2017-11-14 VITALS
OXYGEN SATURATION: 98 % | RESPIRATION RATE: 18 BRPM | DIASTOLIC BLOOD PRESSURE: 85 MMHG | WEIGHT: 159.39 LBS | TEMPERATURE: 98 F | HEART RATE: 74 BPM | SYSTOLIC BLOOD PRESSURE: 119 MMHG

## 2017-11-14 VITALS
TEMPERATURE: 98 F | OXYGEN SATURATION: 100 % | SYSTOLIC BLOOD PRESSURE: 120 MMHG | DIASTOLIC BLOOD PRESSURE: 88 MMHG | HEART RATE: 75 BPM | RESPIRATION RATE: 18 BRPM

## 2017-11-14 DIAGNOSIS — Z79.1 LONG TERM (CURRENT) USE OF NON-STEROIDAL ANTI-INFLAMMATORIES (NSAID): ICD-10-CM

## 2017-11-14 DIAGNOSIS — Z79.891 LONG TERM (CURRENT) USE OF OPIATE ANALGESIC: ICD-10-CM

## 2017-11-14 DIAGNOSIS — H57.12 OCULAR PAIN, LEFT EYE: ICD-10-CM

## 2017-11-14 DIAGNOSIS — Z79.2 LONG TERM (CURRENT) USE OF ANTIBIOTICS: ICD-10-CM

## 2017-11-14 DIAGNOSIS — Z79.899 OTHER LONG TERM (CURRENT) DRUG THERAPY: ICD-10-CM

## 2017-11-14 DIAGNOSIS — H01.024 SQUAMOUS BLEPHARITIS LEFT UPPER EYELID: ICD-10-CM

## 2017-11-14 LAB
ALBUMIN SERPL ELPH-MCNC: 4.6 G/DL — SIGNIFICANT CHANGE UP (ref 3.3–5)
ALP SERPL-CCNC: 96 U/L — SIGNIFICANT CHANGE UP (ref 40–120)
ALT FLD-CCNC: 100 U/L — HIGH (ref 10–45)
ANION GAP SERPL CALC-SCNC: 14 MMOL/L — SIGNIFICANT CHANGE UP (ref 5–17)
AST SERPL-CCNC: 54 U/L — HIGH (ref 10–40)
BASOPHILS NFR BLD AUTO: 0.3 % — SIGNIFICANT CHANGE UP (ref 0–2)
BILIRUB SERPL-MCNC: 0.3 MG/DL — SIGNIFICANT CHANGE UP (ref 0.2–1.2)
BUN SERPL-MCNC: 5 MG/DL — LOW (ref 7–23)
CALCIUM SERPL-MCNC: 9.1 MG/DL — SIGNIFICANT CHANGE UP (ref 8.4–10.5)
CHLORIDE SERPL-SCNC: 103 MMOL/L — SIGNIFICANT CHANGE UP (ref 96–108)
CO2 SERPL-SCNC: 25 MMOL/L — SIGNIFICANT CHANGE UP (ref 22–31)
CREAT SERPL-MCNC: 0.55 MG/DL — SIGNIFICANT CHANGE UP (ref 0.5–1.3)
EOSINOPHIL NFR BLD AUTO: 1.4 % — SIGNIFICANT CHANGE UP (ref 0–6)
GLUCOSE SERPL-MCNC: 99 MG/DL — SIGNIFICANT CHANGE UP (ref 70–99)
HCT VFR BLD CALC: 40.6 % — SIGNIFICANT CHANGE UP (ref 34.5–45)
HGB BLD-MCNC: 13.4 G/DL — SIGNIFICANT CHANGE UP (ref 11.5–15.5)
LYMPHOCYTES # BLD AUTO: 27.7 % — SIGNIFICANT CHANGE UP (ref 13–44)
MCHC RBC-ENTMCNC: 28.3 PG — SIGNIFICANT CHANGE UP (ref 27–34)
MCHC RBC-ENTMCNC: 33 G/DL — SIGNIFICANT CHANGE UP (ref 32–36)
MCV RBC AUTO: 85.7 FL — SIGNIFICANT CHANGE UP (ref 80–100)
MONOCYTES NFR BLD AUTO: 7.1 % — SIGNIFICANT CHANGE UP (ref 2–14)
NEUTROPHILS NFR BLD AUTO: 63.5 % — SIGNIFICANT CHANGE UP (ref 43–77)
PLATELET # BLD AUTO: 382 K/UL — SIGNIFICANT CHANGE UP (ref 150–400)
POTASSIUM SERPL-MCNC: 3.8 MMOL/L — SIGNIFICANT CHANGE UP (ref 3.5–5.3)
POTASSIUM SERPL-SCNC: 3.8 MMOL/L — SIGNIFICANT CHANGE UP (ref 3.5–5.3)
PROT SERPL-MCNC: 7.2 G/DL — SIGNIFICANT CHANGE UP (ref 6–8.3)
RBC # BLD: 4.74 M/UL — SIGNIFICANT CHANGE UP (ref 3.8–5.2)
RBC # FLD: 12.6 % — SIGNIFICANT CHANGE UP (ref 10.3–16.9)
SODIUM SERPL-SCNC: 142 MMOL/L — SIGNIFICANT CHANGE UP (ref 135–145)
WBC # BLD: 6.6 K/UL — SIGNIFICANT CHANGE UP (ref 3.8–10.5)
WBC # FLD AUTO: 6.6 K/UL — SIGNIFICANT CHANGE UP (ref 3.8–10.5)

## 2017-11-14 PROCEDURE — 96374 THER/PROPH/DIAG INJ IV PUSH: CPT | Mod: XU

## 2017-11-14 PROCEDURE — 85025 COMPLETE CBC W/AUTO DIFF WBC: CPT

## 2017-11-14 PROCEDURE — 80053 COMPREHEN METABOLIC PANEL: CPT

## 2017-11-14 PROCEDURE — 99285 EMERGENCY DEPT VISIT HI MDM: CPT

## 2017-11-14 PROCEDURE — 36415 COLL VENOUS BLD VENIPUNCTURE: CPT

## 2017-11-14 PROCEDURE — 70481 CT ORBIT/EAR/FOSSA W/DYE: CPT

## 2017-11-14 PROCEDURE — 99284 EMERGENCY DEPT VISIT MOD MDM: CPT | Mod: 25

## 2017-11-14 PROCEDURE — 87040 BLOOD CULTURE FOR BACTERIA: CPT

## 2017-11-14 PROCEDURE — 70481 CT ORBIT/EAR/FOSSA W/DYE: CPT | Mod: 26,LT

## 2017-11-14 RX ORDER — OXYCODONE AND ACETAMINOPHEN 5; 325 MG/1; MG/1
1 TABLET ORAL ONCE
Qty: 0 | Refills: 0 | Status: DISCONTINUED | OUTPATIENT
Start: 2017-11-14 | End: 2017-11-14

## 2017-11-14 RX ORDER — ERYTHROMYCIN BASE 5 MG/GRAM
1 OINTMENT (GRAM) OPHTHALMIC (EYE)
Qty: 1 | Refills: 0 | OUTPATIENT
Start: 2017-11-14 | End: 2017-11-24

## 2017-11-14 RX ORDER — VANCOMYCIN HCL 1 G
1000 VIAL (EA) INTRAVENOUS ONCE
Qty: 0 | Refills: 0 | Status: COMPLETED | OUTPATIENT
Start: 2017-11-14 | End: 2017-11-14

## 2017-11-14 RX ORDER — CEPHALEXIN 500 MG
1 CAPSULE ORAL
Qty: 14 | Refills: 0
Start: 2017-11-14 | End: 2017-11-21

## 2017-11-14 RX ORDER — AZTREONAM 2 G
1 VIAL (EA) INJECTION
Qty: 14 | Refills: 0 | OUTPATIENT
Start: 2017-11-14 | End: 2017-11-21

## 2017-11-14 RX ADMIN — OXYCODONE AND ACETAMINOPHEN 1 TABLET(S): 5; 325 TABLET ORAL at 18:59

## 2017-11-14 RX ADMIN — OXYCODONE AND ACETAMINOPHEN 1 TABLET(S): 5; 325 TABLET ORAL at 23:09

## 2017-11-14 RX ADMIN — Medication 250 MILLIGRAM(S): at 20:15

## 2017-11-14 NOTE — ED PROVIDER NOTE - ENMT, MLM
Airway patent, Nasal mucosa clear. Mouth with normal mucosa. Throat has no vesicles, no oropharyngeal exudates and uvula is midline. small reactive lymph nodes

## 2017-11-14 NOTE — ED PROVIDER NOTE - ATTENDING CONTRIBUTION TO CARE
33 yo female c/o L eyelid pain and swelling x4 days, now much worse w swelling to face/cheek, ear area and severe pain w minimal touching of area, eye movement.  No vision change, + ha on L side, subjective fever x2 days Just prior to arrival she reports having bloody discharge from the eyelid. No relief w otc meds.   Well appearing, nad, L upper eyelid erythema, swelling w small pustule lateral upper eyelid at lash line, no active bleeding/drainage, + mild erythema, warmth, ttp eyelid and L upper cheek, ear nl, neck nontender w/o lad, vision grossly nl, no gross neuro deficits.  Pt w stye and periorbital cellulitis - ? orbital cellulitis.  Pt given pain meds, abx; ct neg for orbital involvement.  Dc to fu pmd, ophtho w po abx, pain meds, warm compresses.

## 2017-11-14 NOTE — ED PROVIDER NOTE - EYES, MLM
Clear bilaterally, pupils equal, round and reactive to light. left eye lid with swelling and large stye some slight pain with EOM

## 2017-11-14 NOTE — ED PROVIDER NOTE - OBJECTIVE STATEMENT
33 yo F presents to ED for worsening L eyelid pain and swelling x4 days. Pt describes that she started w/ swelling of the left eyelid 4 days ago, which has progressed to pain to the entire L side of her face, eyelid erythema, and periorbital swelling. Pt also c/o chills and subjective fever x2 days, did not check temp at home. Just prior to arrival she reports having bloody discharge from the eyelid. She was applying warm compresses at home, but this was making her pain worse, so she stopped. She has been taking Motrin daily w/ no relief, last took 600 mg this morning around 1000. Pt denies recent illness, cough, or nasal congestion.

## 2017-11-14 NOTE — ED ADULT TRIAGE NOTE - CHIEF COMPLAINT QUOTE
Patient c/o left side of the face and eye swelling , left ear pain and headache since sunday , also with subjective fever and chills since yesterday .

## 2017-11-15 RX ORDER — OXYCODONE HYDROCHLORIDE 5 MG/1
1 TABLET ORAL
Qty: 12 | Refills: 0
Start: 2017-11-15

## 2017-11-15 RX ORDER — CEPHALEXIN 500 MG
1 CAPSULE ORAL
Qty: 28 | Refills: 0
Start: 2017-11-15 | End: 2017-11-22

## 2017-11-15 RX ORDER — ERYTHROMYCIN BASE 5 MG/GRAM
1 OINTMENT (GRAM) OPHTHALMIC (EYE)
Qty: 1 | Refills: 0
Start: 2017-11-15 | End: 2017-11-25

## 2017-11-15 RX ORDER — AZTREONAM 2 G
1 VIAL (EA) INJECTION
Qty: 14 | Refills: 0
Start: 2017-11-15 | End: 2017-11-22

## 2017-11-16 DIAGNOSIS — R18.8 OTHER ASCITES: ICD-10-CM

## 2017-11-16 DIAGNOSIS — R10.32 LEFT LOWER QUADRANT PAIN: ICD-10-CM

## 2017-11-16 DIAGNOSIS — Z33.1 PREGNANT STATE, INCIDENTAL: ICD-10-CM

## 2019-10-30 ENCOUNTER — EMERGENCY (EMERGENCY)
Facility: HOSPITAL | Age: 34
LOS: 1 days | Discharge: HOME | End: 2019-10-30
Attending: EMERGENCY MEDICINE | Admitting: EMERGENCY MEDICINE
Payer: MEDICAID

## 2019-10-30 VITALS
HEIGHT: 60 IN | DIASTOLIC BLOOD PRESSURE: 80 MMHG | OXYGEN SATURATION: 98 % | SYSTOLIC BLOOD PRESSURE: 115 MMHG | TEMPERATURE: 99 F | HEART RATE: 122 BPM | RESPIRATION RATE: 18 BRPM | WEIGHT: 130.07 LBS

## 2019-10-30 DIAGNOSIS — K57.92 DIVERTICULITIS OF INTESTINE, PART UNSPECIFIED, WITHOUT PERFORATION OR ABSCESS WITHOUT BLEEDING: ICD-10-CM

## 2019-10-30 DIAGNOSIS — R11.0 NAUSEA: ICD-10-CM

## 2019-10-30 LAB
ALBUMIN SERPL ELPH-MCNC: 4.1 G/DL — SIGNIFICANT CHANGE UP (ref 3.3–5)
ALP SERPL-CCNC: 141 U/L — HIGH (ref 40–120)
ALT FLD-CCNC: 290 U/L — HIGH (ref 10–45)
ANION GAP SERPL CALC-SCNC: 10 MMOL/L — SIGNIFICANT CHANGE UP (ref 5–17)
APPEARANCE UR: CLEAR — SIGNIFICANT CHANGE UP
APTT BLD: 31 SEC — SIGNIFICANT CHANGE UP (ref 27.5–36.3)
AST SERPL-CCNC: 236 U/L — HIGH (ref 10–40)
BACTERIA # UR AUTO: PRESENT /HPF
BASOPHILS # BLD AUTO: 0.02 K/UL — SIGNIFICANT CHANGE UP (ref 0–0.2)
BASOPHILS NFR BLD AUTO: 0.2 % — SIGNIFICANT CHANGE UP (ref 0–2)
BILIRUB SERPL-MCNC: 0.2 MG/DL — SIGNIFICANT CHANGE UP (ref 0.2–1.2)
BILIRUB UR-MCNC: NEGATIVE — SIGNIFICANT CHANGE UP
BUN SERPL-MCNC: 14 MG/DL — SIGNIFICANT CHANGE UP (ref 7–23)
CALCIUM SERPL-MCNC: 8.9 MG/DL — SIGNIFICANT CHANGE UP (ref 8.4–10.5)
CHLORIDE SERPL-SCNC: 102 MMOL/L — SIGNIFICANT CHANGE UP (ref 96–108)
CK MB CFR SERPL CALC: <1 NG/ML — SIGNIFICANT CHANGE UP (ref 0–6.7)
CK SERPL-CCNC: 134 U/L — SIGNIFICANT CHANGE UP (ref 25–170)
CO2 SERPL-SCNC: 26 MMOL/L — SIGNIFICANT CHANGE UP (ref 22–31)
COLOR SPEC: YELLOW — SIGNIFICANT CHANGE UP
CREAT SERPL-MCNC: 0.63 MG/DL — SIGNIFICANT CHANGE UP (ref 0.5–1.3)
DIFF PNL FLD: ABNORMAL
EOSINOPHIL # BLD AUTO: 0.04 K/UL — SIGNIFICANT CHANGE UP (ref 0–0.5)
EOSINOPHIL NFR BLD AUTO: 0.4 % — SIGNIFICANT CHANGE UP (ref 0–6)
EPI CELLS # UR: ABNORMAL /HPF (ref 0–5)
GLUCOSE SERPL-MCNC: 139 MG/DL — HIGH (ref 70–99)
GLUCOSE UR QL: NEGATIVE — SIGNIFICANT CHANGE UP
HCG SERPL-ACNC: <0 MIU/ML — SIGNIFICANT CHANGE UP
HCT VFR BLD CALC: 42.6 % — SIGNIFICANT CHANGE UP (ref 34.5–45)
HGB BLD-MCNC: 13.5 G/DL — SIGNIFICANT CHANGE UP (ref 11.5–15.5)
IMM GRANULOCYTES NFR BLD AUTO: 0.4 % — SIGNIFICANT CHANGE UP (ref 0–1.5)
INR BLD: 0.97 — SIGNIFICANT CHANGE UP (ref 0.88–1.16)
KETONES UR-MCNC: NEGATIVE — SIGNIFICANT CHANGE UP
LEUKOCYTE ESTERASE UR-ACNC: NEGATIVE — SIGNIFICANT CHANGE UP
LIDOCAIN IGE QN: 32 U/L — SIGNIFICANT CHANGE UP (ref 7–60)
LYMPHOCYTES # BLD AUTO: 1.54 K/UL — SIGNIFICANT CHANGE UP (ref 1–3.3)
LYMPHOCYTES # BLD AUTO: 15.4 % — SIGNIFICANT CHANGE UP (ref 13–44)
MAGNESIUM SERPL-MCNC: 1.8 MG/DL — SIGNIFICANT CHANGE UP (ref 1.6–2.6)
MCHC RBC-ENTMCNC: 29.2 PG — SIGNIFICANT CHANGE UP (ref 27–34)
MCHC RBC-ENTMCNC: 31.7 GM/DL — LOW (ref 32–36)
MCV RBC AUTO: 92.2 FL — SIGNIFICANT CHANGE UP (ref 80–100)
MONOCYTES # BLD AUTO: 0.68 K/UL — SIGNIFICANT CHANGE UP (ref 0–0.9)
MONOCYTES NFR BLD AUTO: 6.8 % — SIGNIFICANT CHANGE UP (ref 2–14)
NEUTROPHILS # BLD AUTO: 7.66 K/UL — HIGH (ref 1.8–7.4)
NEUTROPHILS NFR BLD AUTO: 76.8 % — SIGNIFICANT CHANGE UP (ref 43–77)
NITRITE UR-MCNC: NEGATIVE — SIGNIFICANT CHANGE UP
NRBC # BLD: 0 /100 WBCS — SIGNIFICANT CHANGE UP (ref 0–0)
PH UR: 7 — SIGNIFICANT CHANGE UP (ref 5–8)
PLATELET # BLD AUTO: 297 K/UL — SIGNIFICANT CHANGE UP (ref 150–400)
POTASSIUM SERPL-MCNC: 4 MMOL/L — SIGNIFICANT CHANGE UP (ref 3.5–5.3)
POTASSIUM SERPL-SCNC: 4 MMOL/L — SIGNIFICANT CHANGE UP (ref 3.5–5.3)
PROT SERPL-MCNC: 6.7 G/DL — SIGNIFICANT CHANGE UP (ref 6–8.3)
PROT UR-MCNC: NEGATIVE MG/DL — SIGNIFICANT CHANGE UP
PROTHROM AB SERPL-ACNC: 11 SEC — SIGNIFICANT CHANGE UP (ref 10–12.9)
RBC # BLD: 4.62 M/UL — SIGNIFICANT CHANGE UP (ref 3.8–5.2)
RBC # FLD: 11.9 % — SIGNIFICANT CHANGE UP (ref 10.3–14.5)
RBC CASTS # UR COMP ASSIST: ABNORMAL /HPF
SODIUM SERPL-SCNC: 138 MMOL/L — SIGNIFICANT CHANGE UP (ref 135–145)
SP GR SPEC: 1.02 — SIGNIFICANT CHANGE UP (ref 1–1.03)
TROPONIN T SERPL-MCNC: <0.01 NG/ML — SIGNIFICANT CHANGE UP (ref 0–0.01)
UROBILINOGEN FLD QL: 0.2 E.U./DL — SIGNIFICANT CHANGE UP
WBC # BLD: 9.98 K/UL — SIGNIFICANT CHANGE UP (ref 3.8–10.5)
WBC # FLD AUTO: 9.98 K/UL — SIGNIFICANT CHANGE UP (ref 3.8–10.5)
WBC UR QL: < 5 /HPF — SIGNIFICANT CHANGE UP

## 2019-10-30 PROCEDURE — 93010 ELECTROCARDIOGRAM REPORT: CPT

## 2019-10-30 PROCEDURE — 76705 ECHO EXAM OF ABDOMEN: CPT | Mod: 26

## 2019-10-30 PROCEDURE — 74176 CT ABD & PELVIS W/O CONTRAST: CPT | Mod: 26

## 2019-10-30 PROCEDURE — 99285 EMERGENCY DEPT VISIT HI MDM: CPT

## 2019-10-30 RX ORDER — SODIUM CHLORIDE 9 MG/ML
1000 INJECTION INTRAMUSCULAR; INTRAVENOUS; SUBCUTANEOUS ONCE
Refills: 0 | Status: COMPLETED | OUTPATIENT
Start: 2019-10-30 | End: 2019-10-30

## 2019-10-30 RX ORDER — KETOROLAC TROMETHAMINE 30 MG/ML
30 SYRINGE (ML) INJECTION ONCE
Refills: 0 | Status: DISCONTINUED | OUTPATIENT
Start: 2019-10-30 | End: 2019-10-30

## 2019-10-30 RX ADMIN — SODIUM CHLORIDE 1000 MILLILITER(S): 9 INJECTION INTRAMUSCULAR; INTRAVENOUS; SUBCUTANEOUS at 22:00

## 2019-10-30 RX ADMIN — SODIUM CHLORIDE 1000 MILLILITER(S): 9 INJECTION INTRAMUSCULAR; INTRAVENOUS; SUBCUTANEOUS at 23:00

## 2019-10-30 RX ADMIN — Medication 30 MILLIGRAM(S): at 22:33

## 2019-10-30 RX ADMIN — Medication 30 MILLIGRAM(S): at 22:45

## 2019-10-30 NOTE — ED ADULT NURSE NOTE - CHPI ED NUR SYMPTOMS NEG
no dizziness/no shortness of breath/no congestion/no fever/no nausea/no diaphoresis/no chills/no syncope/no vomiting

## 2019-10-30 NOTE — ED ADULT TRIAGE NOTE - ARRIVAL INFO ADDITIONAL COMMENTS
pt woke with pain under her left breast wrapping around to her back and down the front of her abd.  pain worsens with deep breath.  no sob.  no cough.   no recent travel.  no trauma.

## 2019-10-30 NOTE — ED PROVIDER NOTE - CLINICAL SUMMARY MEDICAL DECISION MAKING FREE TEXT BOX
pain left flank LUQ pain w hx chronic back pain, presenting with left flank-LUQ pain for the past day.  urinary urgency and location of pain suggestive of renal colic.  appears nontoxic in NAD.  mildly tender left side of abdomen without peritoneal signs.   Preg negative.  UA wihtout signs of infection.  CT no obstructing renal stone but final reading showing acute diverticulitis without evidence of perforation or abscess.  LFTs and AP elevated, RUQ sono negative.  plan: abx, pain control.

## 2019-10-30 NOTE — ED PROVIDER NOTE - ATTENDING CONTRIBUTION TO CARE
35 yo fem pmhx chronic back pain, left oophorectomy  c/o pain in left upper abdomen under ribs radiating to posterior left flank and down toward left lower quadrant.  began suddenly in the middle of the night last night waking her from sleep.  nauseated, no vomiting.  mild chills and feeling "weak and woozy".  pain increases with movement and deep breath.  no pain in other areas of chest.  not short of breath.  + urinary urgency today but no hematuria or dysuria.  no diarrhea or blood in stool.  took naproxen this morning wihtout much relief.    pmhx as above  pshx left oophorectomy for ovarian cyst  meds flexeril pRN  NKA  social: nonsmoker, occasional alcohol (2 glasses wine two nights ago).  monogamous with male in 8-month relationship.  no hx of STI, no dyspareunia, no vaginal DC.    GYN: irregular periods, has IUD    Workup shows left sided diverticulitis   Antiotics and pain control given in ED  Advised not to continue narcotics at home in case of progression of inflammation.

## 2019-10-30 NOTE — ED PROVIDER NOTE - CARE PROVIDER_API CALL
Jamil Michaud)  Gastroenterology; Internal Medicine  [address], [address] [place] [address]  Phone: [phone]  Fax: [phone]  Follow Up Time:

## 2019-10-30 NOTE — ED PROVIDER NOTE - PHYSICAL EXAMINATION
CONSTITUTIONAL: WD,WN. NAD.    SKIN: Normal color and turgor. No rash.    HEAD: NC/AT.  EYES: Conjunctiva clear. EOMI. PERRL.    ENT: Airway patent, OP without erythema, tonsillar swelling or exudate; uvula midline without swelling. Nasal mucosa clear, no rhinorrhea.   RESPIRATORY:  Breathing non-labored. No retractions or accessory muscle use.  Lungs CTA bilat.  CARDIOVASCULAR:  RRR, S1S2. No M/R/G.      GI:  Tender across upper abdomen.  + Macias.  Mild TTP LLQ  : + left CVAT (no right CVAT).  MSK: Neck supple with painless ROM.  No lower extremity edema or calf tenderness.  No joint swelling or ROM limitation.  NEURO: Alert and oriented; CN II-XII grossly intact. Speech clear. 5/5 strength in all extremities.  Normal balance and gait.

## 2019-10-30 NOTE — ED PROVIDER NOTE - OBJECTIVE STATEMENT
33 yo fem pmhx chronic back pain, left oophorectomy  c/o pain in left upper abdomen under ribs radiating to posterior left flank and down toward left lower quadrant.  began suddenly in the middle of the night last night waking her from sleep.  nauseated, no vomiting.  mild chills and feeling "weak and woozy".  pain increases with movement and deep breath.  no pain in other areas of chest.  not short of breath.  + urinary urgency today but no hematuria or dysuria.  no diarrhea or blood in stool.  took naproxen this morning wihtout much relief.    pmhx as above  pshx left oophorectomy for ovarian cyst  meds flexeril pRN  NKA  social: nonsmoker, occasional alcohol (2 glasses wine two nights ago).  monogamous with male in 8-month relationship.  no hx of STI, no dyspareunia, no vaginal DC.    GYN: irregular periods, has IUD

## 2019-10-30 NOTE — ED PROVIDER NOTE - NS ED ROS FT
CONSTITUTIONAL: No fever, chills, or weakness  NEURO: No headache, no dizziness, no syncope; No focal weakness/tingling/numbness  EYES: No visual changes  ENT: No rhinorrhea or sore throat  PULM: No cough or dyspnea  CV: No chest pain or palpitations  GI: HPI  : HPI  MSK: No neck pain, no joint pain  SKIN: no rash or unusual bruising CONSTITUTIONAL: HPI  NEURO: No headache, no dizziness, no syncope; No focal weakness/tingling/numbness  EYES: No visual changes  ENT: No rhinorrhea or sore throat  PULM: No cough or dyspnea  CV: No chest pain or palpitations  GI: HPI  : HPI  MSK: No neck pain, no joint pain  SKIN: no rash or unusual bruising

## 2019-10-30 NOTE — ED PROVIDER NOTE - PATIENT PORTAL LINK FT
You can access the FollowMyHealth Patient Portal offered by [facility] by registering at the following website: [email or web address]. By joining [facility]’s FollowMyHealth portal, you will also be able to view your health information using other applications (apps) compatible with our system.

## 2019-10-30 NOTE — ED PROVIDER NOTE - NSFOLLOWUPINSTRUCTIONS_ED_ALL_ED_FT
Take antibiotics as prescribed.  Ibuprofen 600 mg every 6 hours if needed for pain.   Follow up with your doctor - call today.  You may need referral to gastroenterologist - please discuss with your doctor.  Return to the Emergency Department if you have any new or worsening symptoms, or if you have any concerns.  _____________________________________________________________________________    DIVERTICULITIS - AfterCare(R) Instructions(ER/ED)     Diverticulitis    WHAT YOU NEED TO KNOW:    Diverticulitis is a condition that causes small pockets along your intestine called diverticula to become inflamed or infected. This is caused by hard bowel movements, food, or bacteria that get stuck in the pockets.    DISCHARGE INSTRUCTIONS:    Return to the emergency department if:     You have bowel movement or foul-smelling discharge leaking from your vagina or in your urine.      You have severe diarrhea.      You urinate less than usual or not at all.      You are not able to have a bowel movement.      You cannot stop vomiting.       You have severe abdominal pain, a fever, and your abdomen is larger than usual.       You have new or increased blood in your bowel movements.     Contact your healthcare provider if:     You have pain when you urinate.      Your symptoms get worse or do not go away.       You have questions or concerns about your condition or care.     Medicines:     Antibiotics may be given to help treat a bacterial infection.      Prescription pain medicine may be given. Ask your healthcare provider how to take this medicine safely. Some prescription pain medicines contain acetaminophen. Do not take other medicines that contain acetaminophen without talking to your healthcare provider. Too much acetaminophen may cause liver damage. Prescription pain medicine may cause constipation. Ask your healthcare provider how to prevent or treat constipation.       Take your medicine as directed. Contact your healthcare provider if you think your medicine is not helping or if you have side effects. Tell him or her if you are allergic to any medicine. Keep a list of the medicines, vitamins, and herbs you take. Include the amounts, and when and why you take them. Bring the list or the pill bottles to follow-up visits. Carry your medicine list with you in case of an emergency.    Clear liquid diet: A clear liquid diet includes any liquids that you can see through. Examples include water, ginger-ian, cranberry or apple juice, frozen fruit ice, or broth. Stay on a clear liquid diet until your symptoms are gone, or as directed.     Follow up with your healthcare provider as directed: You may need to return for a colonoscopy. When your symptoms are gone, you may need a low-fat, high-fiber diet to prevent diverticulitis from developing again. Your healthcare provider or dietitian can help you create meal plans. Write down your questions so you remember to ask them during your visits.

## 2019-10-31 VITALS
DIASTOLIC BLOOD PRESSURE: 62 MMHG | RESPIRATION RATE: 17 BRPM | HEART RATE: 87 BPM | OXYGEN SATURATION: 99 % | SYSTOLIC BLOOD PRESSURE: 108 MMHG | TEMPERATURE: 98 F

## 2019-10-31 PROBLEM — M50.20 OTHER CERVICAL DISC DISPLACEMENT, UNSPECIFIED CERVICAL REGION: Chronic | Status: ACTIVE | Noted: 2017-09-28

## 2019-10-31 PROCEDURE — 99284 EMERGENCY DEPT VISIT MOD MDM: CPT | Mod: 25

## 2019-10-31 PROCEDURE — 81001 URINALYSIS AUTO W/SCOPE: CPT

## 2019-10-31 PROCEDURE — 93005 ELECTROCARDIOGRAM TRACING: CPT

## 2019-10-31 PROCEDURE — 85730 THROMBOPLASTIN TIME PARTIAL: CPT

## 2019-10-31 PROCEDURE — 76705 ECHO EXAM OF ABDOMEN: CPT

## 2019-10-31 PROCEDURE — 96375 TX/PRO/DX INJ NEW DRUG ADDON: CPT

## 2019-10-31 PROCEDURE — 82550 ASSAY OF CK (CPK): CPT

## 2019-10-31 PROCEDURE — 96374 THER/PROPH/DIAG INJ IV PUSH: CPT

## 2019-10-31 PROCEDURE — 74176 CT ABD & PELVIS W/O CONTRAST: CPT

## 2019-10-31 PROCEDURE — 85025 COMPLETE CBC W/AUTO DIFF WBC: CPT

## 2019-10-31 PROCEDURE — 96376 TX/PRO/DX INJ SAME DRUG ADON: CPT

## 2019-10-31 PROCEDURE — 85610 PROTHROMBIN TIME: CPT

## 2019-10-31 PROCEDURE — 83735 ASSAY OF MAGNESIUM: CPT

## 2019-10-31 PROCEDURE — 36415 COLL VENOUS BLD VENIPUNCTURE: CPT

## 2019-10-31 PROCEDURE — 82553 CREATINE MB FRACTION: CPT

## 2019-10-31 PROCEDURE — 84484 ASSAY OF TROPONIN QUANT: CPT

## 2019-10-31 PROCEDURE — 83690 ASSAY OF LIPASE: CPT

## 2019-10-31 PROCEDURE — 96361 HYDRATE IV INFUSION ADD-ON: CPT

## 2019-10-31 PROCEDURE — 84702 CHORIONIC GONADOTROPIN TEST: CPT

## 2019-10-31 PROCEDURE — 80053 COMPREHEN METABOLIC PANEL: CPT

## 2019-10-31 RX ORDER — LIDOCAINE 4 G/100G
1 CREAM TOPICAL ONCE
Refills: 0 | Status: COMPLETED | OUTPATIENT
Start: 2019-10-31 | End: 2019-10-31

## 2019-10-31 RX ORDER — MORPHINE SULFATE 50 MG/1
2 CAPSULE, EXTENDED RELEASE ORAL ONCE
Refills: 0 | Status: DISCONTINUED | OUTPATIENT
Start: 2019-10-31 | End: 2019-10-31

## 2019-10-31 RX ORDER — IBUPROFEN 200 MG
1 TABLET ORAL
Qty: 20 | Refills: 0
Start: 2019-10-31

## 2019-10-31 RX ORDER — OXYCODONE AND ACETAMINOPHEN 5; 325 MG/1; MG/1
1 TABLET ORAL ONCE
Refills: 0 | Status: DISCONTINUED | OUTPATIENT
Start: 2019-10-31 | End: 2019-10-31

## 2019-10-31 RX ADMIN — LIDOCAINE 1 PATCH: 4 CREAM TOPICAL at 03:06

## 2019-10-31 RX ADMIN — MORPHINE SULFATE 2 MILLIGRAM(S): 50 CAPSULE, EXTENDED RELEASE ORAL at 00:17

## 2019-10-31 RX ADMIN — OXYCODONE AND ACETAMINOPHEN 1 TABLET(S): 5; 325 TABLET ORAL at 02:05

## 2019-10-31 RX ADMIN — OXYCODONE AND ACETAMINOPHEN 1 TABLET(S): 5; 325 TABLET ORAL at 02:35

## 2019-10-31 RX ADMIN — MORPHINE SULFATE 2 MILLIGRAM(S): 50 CAPSULE, EXTENDED RELEASE ORAL at 03:21

## 2019-10-31 RX ADMIN — Medication 1 TABLET(S): at 02:05

## 2019-10-31 RX ADMIN — MORPHINE SULFATE 2 MILLIGRAM(S): 50 CAPSULE, EXTENDED RELEASE ORAL at 03:06

## 2019-10-31 RX ADMIN — MORPHINE SULFATE 2 MILLIGRAM(S): 50 CAPSULE, EXTENDED RELEASE ORAL at 00:32

## 2019-10-31 NOTE — ED ADULT NURSE REASSESSMENT NOTE - NS ED NURSE REASSESS COMMENT FT1
additional meds. ordered as noted, awaiting Lidocaine Patch from Pharmacy
interventions as noted, jania. well, pt. was provided with warm blanket for comfort, friend remains at bedside
pt. ambulated to restroom and back to bed with steady gait noted
KIM Schultz at bedside speaking with pt., medicated as noted, jania. well, assessment on-going, pending further orders
pt. medicated as noted, jania. well, assessment on-going. awaiting dispo.
upgraded to KIM Schultz for Pain

## 2020-03-19 ENCOUNTER — INPATIENT (INPATIENT)
Facility: HOSPITAL | Age: 35
LOS: 1 days | Discharge: HOME | DRG: 871 | End: 2020-03-21
Attending: STUDENT IN AN ORGANIZED HEALTH CARE EDUCATION/TRAINING PROGRAM | Admitting: STUDENT IN AN ORGANIZED HEALTH CARE EDUCATION/TRAINING PROGRAM
Payer: MEDICAID

## 2020-03-19 VITALS
WEIGHT: 160.06 LBS | HEART RATE: 130 BPM | SYSTOLIC BLOOD PRESSURE: 123 MMHG | RESPIRATION RATE: 20 BRPM | DIASTOLIC BLOOD PRESSURE: 83 MMHG | TEMPERATURE: 100 F | OXYGEN SATURATION: 100 %

## 2020-03-19 DIAGNOSIS — J96.91 RESPIRATORY FAILURE, UNSPECIFIED WITH HYPOXIA: ICD-10-CM

## 2020-03-19 DIAGNOSIS — A41.9 SEPSIS, UNSPECIFIED ORGANISM: ICD-10-CM

## 2020-03-19 DIAGNOSIS — Z87.891 PERSONAL HISTORY OF NICOTINE DEPENDENCE: ICD-10-CM

## 2020-03-19 DIAGNOSIS — R63.8 OTHER SYMPTOMS AND SIGNS CONCERNING FOOD AND FLUID INTAKE: ICD-10-CM

## 2020-03-19 DIAGNOSIS — B97.29 OTHER CORONAVIRUS AS THE CAUSE OF DISEASES CLASSIFIED ELSEWHERE: ICD-10-CM

## 2020-03-19 DIAGNOSIS — J18.9 PNEUMONIA, UNSPECIFIED ORGANISM: ICD-10-CM

## 2020-03-19 DIAGNOSIS — Z29.9 ENCOUNTER FOR PROPHYLACTIC MEASURES, UNSPECIFIED: ICD-10-CM

## 2020-03-19 DIAGNOSIS — Z76.89 PERSONS ENCOUNTERING HEALTH SERVICES IN OTHER SPECIFIED CIRCUMSTANCES: ICD-10-CM

## 2020-03-19 DIAGNOSIS — J45.20 MILD INTERMITTENT ASTHMA, UNCOMPLICATED: ICD-10-CM

## 2020-03-19 LAB
ALBUMIN SERPL ELPH-MCNC: 4.2 G/DL — SIGNIFICANT CHANGE UP (ref 3.3–5)
ALP SERPL-CCNC: 160 U/L — HIGH (ref 40–120)
ALT FLD-CCNC: 30 U/L — SIGNIFICANT CHANGE UP (ref 10–45)
ANION GAP SERPL CALC-SCNC: 16 MMOL/L — SIGNIFICANT CHANGE UP (ref 5–17)
AST SERPL-CCNC: 33 U/L — SIGNIFICANT CHANGE UP (ref 10–40)
BASOPHILS # BLD AUTO: 0.01 K/UL — SIGNIFICANT CHANGE UP (ref 0–0.2)
BASOPHILS NFR BLD AUTO: 0.2 % — SIGNIFICANT CHANGE UP (ref 0–2)
BILIRUB SERPL-MCNC: 0.5 MG/DL — SIGNIFICANT CHANGE UP (ref 0.2–1.2)
BUN SERPL-MCNC: 7 MG/DL — SIGNIFICANT CHANGE UP (ref 7–23)
CALCIUM SERPL-MCNC: 8.9 MG/DL — SIGNIFICANT CHANGE UP (ref 8.4–10.5)
CHLORIDE SERPL-SCNC: 96 MMOL/L — SIGNIFICANT CHANGE UP (ref 96–108)
CO2 SERPL-SCNC: 24 MMOL/L — SIGNIFICANT CHANGE UP (ref 22–31)
CREAT SERPL-MCNC: 0.57 MG/DL — SIGNIFICANT CHANGE UP (ref 0.5–1.3)
EOSINOPHIL # BLD AUTO: 0.01 K/UL — SIGNIFICANT CHANGE UP (ref 0–0.5)
EOSINOPHIL NFR BLD AUTO: 0.2 % — SIGNIFICANT CHANGE UP (ref 0–6)
GAS PNL BLDV: SIGNIFICANT CHANGE UP
GLUCOSE SERPL-MCNC: 106 MG/DL — HIGH (ref 70–99)
HCT VFR BLD CALC: 49.3 % — HIGH (ref 34.5–45)
HGB BLD-MCNC: 16.4 G/DL — HIGH (ref 11.5–15.5)
IMM GRANULOCYTES NFR BLD AUTO: 0.5 % — SIGNIFICANT CHANGE UP (ref 0–1.5)
LACTATE SERPL-SCNC: 1.7 MMOL/L — SIGNIFICANT CHANGE UP (ref 0.5–2)
LYMPHOCYTES # BLD AUTO: 1.07 K/UL — SIGNIFICANT CHANGE UP (ref 1–3.3)
LYMPHOCYTES # BLD AUTO: 18.1 % — SIGNIFICANT CHANGE UP (ref 13–44)
MCHC RBC-ENTMCNC: 29 PG — SIGNIFICANT CHANGE UP (ref 27–34)
MCHC RBC-ENTMCNC: 33.3 GM/DL — SIGNIFICANT CHANGE UP (ref 32–36)
MCV RBC AUTO: 87.3 FL — SIGNIFICANT CHANGE UP (ref 80–100)
MONOCYTES # BLD AUTO: 0.33 K/UL — SIGNIFICANT CHANGE UP (ref 0–0.9)
MONOCYTES NFR BLD AUTO: 5.6 % — SIGNIFICANT CHANGE UP (ref 2–14)
NEUTROPHILS # BLD AUTO: 4.47 K/UL — SIGNIFICANT CHANGE UP (ref 1.8–7.4)
NEUTROPHILS NFR BLD AUTO: 75.4 % — SIGNIFICANT CHANGE UP (ref 43–77)
NRBC # BLD: 0 /100 WBCS — SIGNIFICANT CHANGE UP (ref 0–0)
PLATELET # BLD AUTO: 256 K/UL — SIGNIFICANT CHANGE UP (ref 150–400)
POTASSIUM SERPL-MCNC: 4 MMOL/L — SIGNIFICANT CHANGE UP (ref 3.5–5.3)
POTASSIUM SERPL-SCNC: 4 MMOL/L — SIGNIFICANT CHANGE UP (ref 3.5–5.3)
PROT SERPL-MCNC: 8.1 G/DL — SIGNIFICANT CHANGE UP (ref 6–8.3)
RAPID RVP RESULT: SIGNIFICANT CHANGE UP
RBC # BLD: 5.65 M/UL — HIGH (ref 3.8–5.2)
RBC # FLD: 12.2 % — SIGNIFICANT CHANGE UP (ref 10.3–14.5)
SODIUM SERPL-SCNC: 136 MMOL/L — SIGNIFICANT CHANGE UP (ref 135–145)
WBC # BLD: 5.92 K/UL — SIGNIFICANT CHANGE UP (ref 3.8–10.5)
WBC # FLD AUTO: 5.92 K/UL — SIGNIFICANT CHANGE UP (ref 3.8–10.5)

## 2020-03-19 PROCEDURE — 71250 CT THORAX DX C-: CPT | Mod: 26

## 2020-03-19 PROCEDURE — 99223 1ST HOSP IP/OBS HIGH 75: CPT | Mod: GC

## 2020-03-19 PROCEDURE — 93010 ELECTROCARDIOGRAM REPORT: CPT

## 2020-03-19 PROCEDURE — 71045 X-RAY EXAM CHEST 1 VIEW: CPT | Mod: 26

## 2020-03-19 PROCEDURE — 99291 CRITICAL CARE FIRST HOUR: CPT

## 2020-03-19 RX ORDER — ACETAMINOPHEN 500 MG
1000 TABLET ORAL ONCE
Refills: 0 | Status: COMPLETED | OUTPATIENT
Start: 2020-03-19 | End: 2020-03-19

## 2020-03-19 RX ORDER — ACETAMINOPHEN 500 MG
650 TABLET ORAL EVERY 6 HOURS
Refills: 0 | Status: DISCONTINUED | OUTPATIENT
Start: 2020-03-19 | End: 2020-03-21

## 2020-03-19 RX ORDER — SODIUM CHLORIDE 9 MG/ML
1000 INJECTION INTRAMUSCULAR; INTRAVENOUS; SUBCUTANEOUS ONCE
Refills: 0 | Status: COMPLETED | OUTPATIENT
Start: 2020-03-19 | End: 2020-03-19

## 2020-03-19 RX ORDER — CEFTRIAXONE 500 MG/1
1000 INJECTION, POWDER, FOR SOLUTION INTRAMUSCULAR; INTRAVENOUS ONCE
Refills: 0 | Status: COMPLETED | OUTPATIENT
Start: 2020-03-19 | End: 2020-03-19

## 2020-03-19 RX ORDER — CYCLOBENZAPRINE HYDROCHLORIDE 10 MG/1
0 TABLET, FILM COATED ORAL
Qty: 0 | Refills: 0 | DISCHARGE

## 2020-03-19 RX ORDER — AZITHROMYCIN 500 MG/1
250 TABLET, FILM COATED ORAL EVERY 24 HOURS
Refills: 0 | Status: DISCONTINUED | OUTPATIENT
Start: 2020-03-20 | End: 2020-03-21

## 2020-03-19 RX ORDER — AZITHROMYCIN 500 MG/1
500 TABLET, FILM COATED ORAL ONCE
Refills: 0 | Status: COMPLETED | OUTPATIENT
Start: 2020-03-19 | End: 2020-03-19

## 2020-03-19 RX ORDER — CEFTRIAXONE 500 MG/1
1000 INJECTION, POWDER, FOR SOLUTION INTRAMUSCULAR; INTRAVENOUS EVERY 24 HOURS
Refills: 0 | Status: DISCONTINUED | OUTPATIENT
Start: 2020-03-20 | End: 2020-03-20

## 2020-03-19 RX ADMIN — SODIUM CHLORIDE 1000 MILLILITER(S): 9 INJECTION INTRAMUSCULAR; INTRAVENOUS; SUBCUTANEOUS at 22:03

## 2020-03-19 RX ADMIN — SODIUM CHLORIDE 1000 MILLILITER(S): 9 INJECTION INTRAMUSCULAR; INTRAVENOUS; SUBCUTANEOUS at 20:04

## 2020-03-19 RX ADMIN — SODIUM CHLORIDE 1000 MILLILITER(S): 9 INJECTION INTRAMUSCULAR; INTRAVENOUS; SUBCUTANEOUS at 21:27

## 2020-03-19 RX ADMIN — AZITHROMYCIN 255 MILLIGRAM(S): 500 TABLET, FILM COATED ORAL at 21:57

## 2020-03-19 RX ADMIN — Medication 1000 MILLIGRAM(S): at 21:27

## 2020-03-19 RX ADMIN — CEFTRIAXONE 100 MILLIGRAM(S): 500 INJECTION, POWDER, FOR SOLUTION INTRAMUSCULAR; INTRAVENOUS at 21:41

## 2020-03-19 RX ADMIN — Medication 400 MILLIGRAM(S): at 20:04

## 2020-03-19 NOTE — H&P ADULT - PROBLEM SELECTOR PROBLEM 3
Nutrition, metabolism, and development symptoms Asthma, unspecified asthma severity, unspecified whether complicated, unspecified whether persistent Acute respiratory failure with hypoxia

## 2020-03-19 NOTE — H&P ADULT - NSHPLABSRESULTS_GEN_ALL_CORE
LABS:                         16.4   5.92  )-----------( 256      ( 19 Mar 2020 20:06 )             49.3     03-19    136  |  96  |  7   ----------------------------<  106<H>  4.0   |  24  |  0.57    Ca    8.9      19 Mar 2020 20:06    TPro  8.1  /  Alb  4.2  /  TBili  0.5  /  DBili  x   /  AST  33  /  ALT  30  /  AlkPhos  160<H>  03-19    Lactate, Blood: 1.7 mmol/L (03-19 @ 20:05)    RADIOLOGY, EKG & ADDITIONAL TESTS:  < from: CT Chest No Cont (03.19.20 @ 20:58) >    FINDINGS:    LUNGS: No pleural effusions are seen. Evaluation of the pulmonary parenchyma demonstrates multifocal groundglass and patchy consolidations in the lungs bilaterally predominantly in the lower lobes and peripheral in distribution.    MEDIASTINUM: The heart is normal in size.  No pericardial effusion is seen.  Evaluation of the vasculature is limited without intravenous contrast, but the great vessels are grossly unremarkable.  Evaluation for adenopathy is limited without intravenous contrast, however no gross mediastinal, hilar, axillary or supraclavicular lymphadenopathy is identified.    UPPER ABDOMEN, SOFT TISSUES AND BONES: Limited evaluation of the upper abdomen demonstrates no abnormality. Evaluation of the osseous structures demonstrates degenerative changes. 2.2 mm nonobstructing calculus in the left kidney.    IMPRESSION:  Predominantly peripheral patchy and ground glass consolidations in the lungs bilaterally. These findings may represent  COVID-19. Differential diagnosis includes other viral pneumonias, multifocal pneumonia, organizing pneumonia oratypical pneumonia.     Nonobstructive renal calculus in left kidney.

## 2020-03-19 NOTE — ED PROVIDER NOTE - ATTENDING CONTRIBUTION TO CARE
35 yo F mild asthmatic p/w 8 days cough, sore throat, sob, progressively worsening and diarrhea.  Difficulty taking breaths, no definite wheezing, decreased aeration.  Pt with low SPO2 with walking low 90s with tachypnea to 20s and + max.  CXR b/l infiltrates.  CT showing ev of likely COVID.  Plan IV abx, gentle IVF, tylenol and likely admit given severity.

## 2020-03-19 NOTE — H&P ADULT - PROBLEM SELECTOR PLAN 2
presents with 1 week hx of cough, fever, SOB, myalgia, 2 day hx of loose stools; CT chest concerning for COVID-19 given b/l ground glass opacities  - C/w ceftriaxone 1g IV (3/19 - 3/23)  - C/w azithromycin (3/19 - 3/23)  - F/u COVID-19 inflammatory markers  - F/u COVID-19 PCR  - patient his mild-moderate disease with category 1 risk factor (asthma), will continue supportive care and observation now  - supplemental O2 presents with 1 week hx of cough, fever, SOB, myalgia, 2 day hx of loose stools; CT chest concerning for COVID-19 given b/l ground glass opacities  - stopping ctx  - C/w azithromycin (3/19 - 3/23)  - will start plaquenil after urine HCG negative  - F/u COVID-19 inflammatory markers  - F/u COVID-19 PCR  - patient his mild-moderate disease with category 1 risk factor (asthma), will continue supportive care and observation now  - supplemental O2

## 2020-03-19 NOTE — H&P ADULT - PROBLEM SELECTOR PROBLEM 4
Prophylactic measure Nutrition, metabolism, and development symptoms Pneumonia of both lungs due to infectious organism, unspecified part of lung

## 2020-03-19 NOTE — H&P ADULT - PROBLEM SELECTOR PROBLEM 6
Encounter for support and coordination of transition of care Nutrition, metabolism, and development symptoms

## 2020-03-19 NOTE — H&P ADULT - PROBLEM SELECTOR PLAN 4
Ppx: IMPROVE Score 0, no pharm ppx VTE needed  D: RMF  FULL CODE F: no IVF  E: K>4 Mg>2, monitor and replete as needed  N: regular diet ADDENDUM: suspect viral in light of high suspicion for COVID-19 infection; given underlying risk factors (hx of asthma), hypoxia and +inflammatory markers, based on [facility] treatment guidelines will initiate plaquenil , c/w azithromycin

## 2020-03-19 NOTE — H&P ADULT - ASSESSMENT
34F with history of mild intermittent asthma, presents with 1 week hx of cough, fever, SOB, myalgia, 2 day hx of loose stools; CT chest concerning for COVID-19 given b/l ground glass opacities

## 2020-03-19 NOTE — H&P ADULT - PROBLEM SELECTOR PLAN 3
F: no IVF  E: K>4 Mg>2, monitor and replete as needed  N: regular diet ADDENDUM: hx of controlled asthma, monitor for worsening sxs, on MDI albuterol prn see #1 and 2, supplemental o2 o/n , wean off as tolerated.

## 2020-03-19 NOTE — H&P ADULT - NSHPPHYSICALEXAM_GEN_ALL_CORE
VITAL SIGNS:  T(C): 37.9 (03-19-20 @ 22:04), Max: 38.4 (03-19-20 @ 21:08)  T(F): 100.3 (03-19-20 @ 22:04), Max: 101.1 (03-19-20 @ 21:08)  HR: 104 (03-19-20 @ 22:04) (98 - 130)  BP: 110/71 (03-19-20 @ 22:04) (110/71 - 123/83)  BP(mean): --  RR: 20 (03-19-20 @ 22:04) (20 - 22)  SpO2: 97% (03-19-20 @ 22:04) (92% - 100%)  Wt(kg): --    PHYSICAL EXAM:    Constitutional: WDWN, lying comfortably in bed, NAD  Head: Nc/At  Eyes: PERRL, EOMI, clear conjunctiva  ENT: no nasal discharge; uvula midline, no oropharyngeal erythema or exudates; MMM  Neck: supple; no JVD or thyromegaly  Respiratory: CTA b/l, no wheezes, rales, or rhonchi  Cardiac: +S1/S2, +RRR, no murmurs, rubs, or gallops  Gastrointestinal: soft, non-tender, non-distended, no rebound/guarding, no palpable masses, normoactive bowel sounds x4  Back: spine midline, no bony tenderness or step-offs; no CVAT B/L  Extremities: WWP, no clubbing or cyanosis, no peripheral edema  Musculoskeletal: NROM x4; no joint swelling, tenderness or erythema  Vascular: 2+ radial and DP pulses b/l  Dermatologic: skin warm, dry and intact, no rashes, wounds, or scars  Lymphatic: no submandibular or cervical LAD  Neurologic: AAOx3, CNII-XII grossly intact, no focal deficits  Psychiatric: affect and characteristics of appearance, verbalizations, behaviors are appropriate VITAL SIGNS:  T(C): 37.9 (03-19-20 @ 22:04), Max: 38.4 (03-19-20 @ 21:08)  T(F): 100.3 (03-19-20 @ 22:04), Max: 101.1 (03-19-20 @ 21:08)  HR: 104 (03-19-20 @ 22:04) (98 - 130)  BP: 110/71 (03-19-20 @ 22:04) (110/71 - 123/83)  BP(mean): --  RR: 20 (03-19-20 @ 22:04) (20 - 22)  SpO2: 97% (03-19-20 @ 22:04) (92% - 100%)  Wt(kg): --    PHYSICAL EXAM:    Constitutional: WDWN, lying comfortably in bed, NAD  Head: Nc/At  Eyes: PERRL, EOMI, clear conjunctiva  ENT: no nasal discharge; DMM  Neck: supple; no JVD  Respiratory: b/l crackles, no wheezing, +dry intermittent cough during deep inspiration  Cardiac: +S1/S2, +RRR, no murmurs, rubs, or gallops  Gastrointestinal: soft, non-tender, non-distended, normoactive bowel sounds x4  Extremities: WWP, no clubbing or cyanosis, no peripheral edema  Musculoskeletal: NROM x4; no joint swelling, tenderness or erythema  Vascular: 2+ radial and DP pulses b/l  Dermatologic: skin warm, dry and intact, no rashes, wounds, or scars  Lymphatic: no submandibular or cervical LAD  Neurologic: AAOx3, CNII-XII grossly intact, no focal deficits, 5/5 strength b/l UE and LE

## 2020-03-19 NOTE — H&P ADULT - PROBLEM SELECTOR PROBLEM 5
Encounter for support and coordination of transition of care Prophylactic measure Asthma, unspecified asthma severity, unspecified whether complicated, unspecified whether persistent

## 2020-03-19 NOTE — H&P ADULT - PROBLEM SELECTOR PLAN 6
1) PCP Contacted on Admission: (Y/N) --> Name & Phone #:  2) Date of Contact with PCP:  3) PCP Contacted at Discharge: (Y/N, N/A)  4) Summary of Handoff Given to PCP:   5) Post-Discharge Appointment Date and Location: F: no IVF  E: K>4 Mg>2, monitor and replete as needed  N: regular diet

## 2020-03-19 NOTE — H&P ADULT - NSHPSOCIALHISTORY_GEN_ALL_CORE
Used to smoke cigarettes socially (1-2 cigs at a time, several months ago), drinks 1-2 alcoholic beverages a month, used to smoke marijuana. 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

## 2020-03-19 NOTE — ED PROVIDER NOTE - CLINICAL SUMMARY MEDICAL DECISION MAKING FREE TEXT BOX
33 yo female in the ER with flu-like symptoms x 8 days. Pt reports condition gradually became worse and her temperature is getting higher, c/o severe body aches, decreased PO intake, chest tightness and pain with dry cough, SOB. Clinical presentation suspicious for covid-19 infections/pneumonia with risk of ARDS. plan : labs, imaging, supportive care, admit for further management.

## 2020-03-19 NOTE — H&P ADULT - PROBLEM SELECTOR PLAN 5
1) PCP Contacted on Admission: (Y/N) --> Name & Phone #:  2) Date of Contact with PCP:  3) PCP Contacted at Discharge: (Y/N, N/A)  4) Summary of Handoff Given to PCP:   5) Post-Discharge Appointment Date and Location: Ppx: IMPROVE Score 0, no pharm ppx VTE needed  D: RMF  FULL CODE ADDENDUM: hx of controlled asthma, monitor for worsening sxs, on MDI albuterol prn

## 2020-03-19 NOTE — H&P ADULT - HISTORY OF PRESENT ILLNESS
34F with history of mild intermittent asthma (has not used an inhaler in years, never intubated), presents with 7 day history of dry cough and fever. Patient stated last Thursday, at work, starting experiencing dry cough intermittently and fever up to 101. Patient saw her PCP and was told to self quarantine herself, which she had been for a week. Symptoms associated with intermittent ANDRE. ANDRE and cough were at its most severe yesterday evening, to the point where the patient couldn't breathe. patient lives w/ her mom and her mom drive her to the hospital (pt wore a mask). Symptoms also associated with decreased oral intake, last full meal was 4 days ago, she states anything she tries to eat she would feel extremely nauseous to the point of "throwing up/dry heaving." Patient had loose stools for the last 2 days. Denies chills, abd pain, CP, +arthralgia, +headache, denies dysuria/polyuria. Denies recent travel or sick contacts.    ED vitals: Tmax 101.1, , /83, RR 20, O2 sat 100% on RA (de sat to 92% tachypneic 22 now on 3LPM NC at 97%)  ED labs: Hb 16.4 (baseline 13), alk phos 160, lactate 1.7  ED studies: UA dirty, RVP negative, CXR w/ b/l reticular opacities, CT w/ b/l GGO concerning for COVID-19, EKG sinus tach  ED course: tylenol 1g IV, ceftriaxone, azithromycin, 2L NS bolus 34F with history of mild intermittent asthma (has not used an inhaler in years, never intubated), presents with 7 day history of dry cough and fever. Patient stated last Thursday, at work, starting experiencing dry cough intermittently and fever up to 101. Patient saw her PCP and was told to self quarantine herself, which she had been for a week. Symptoms associated with intermittent ANDRE. ANDRE and cough were at its most severe yesterday evening, to the point where the patient couldn't breathe. patient lives w/ her mom and her mom drive her to the hospital (pt wore a mask). Symptoms also associated with decreased oral intake, last full meal was 4 days ago, she states anything she tries to eat she would feel extremely nauseous to the point of "throwing up/dry heaving." Patient had loose stools for the last 2 days. Denies chills, abd pain, CP, +myalgia, +headache, denies dysuria/polyuria. Denies recent travel or sick contacts.    ED vitals: Tmax 101.1, , /83, RR 20, O2 sat 100% on RA (de sat to 92% tachypneic 22 now on 3LPM NC at 97%)  ED labs: Hb 16.4 (baseline 13), alk phos 160, lactate 1.7  ED studies: UA dirty, RVP negative, CXR w/ b/l reticular opacities, CT w/ b/l GGO concerning for COVID-19, EKG sinus tach  ED course: tylenol 1g IV, ceftriaxone, azithromycin, 2L NS bolus

## 2020-03-19 NOTE — ED PROVIDER NOTE - OBJECTIVE STATEMENT
33 yo female in the ER c/o fever, chills, malaise, body aches, sore throat, cough. Symptoms started 8 days ago. Pt reports she was seen by PMD and recommended to self isolate herself.  Pt concerned that  she is getting worse. States for the past 4 days she was not able to eat anything, c/o nausea, decreased appetite, had diarrhea x 2 days, c/o temperature to go higher and higher. Pt c/o worsening of SOB and chest tightness since yesterday,

## 2020-03-19 NOTE — H&P ADULT - PROBLEM SELECTOR PLAN 1
febrile and tachycardic, lactate 1.7. Reperfusion assessment complete, 2L NS bolus.  - F/u blood cultures  - s/p cef/azithro, c/w abx treatment for now given higher suspicion of viral pna but with risk factor, concern for superimposed bacterial infection  - plan as per below

## 2020-03-19 NOTE — ED ADULT NURSE REASSESSMENT NOTE - NS ED NURSE REASSESS COMMENT FT1
Patient a/oX 3, c/o of chest pain w/ SOB, nausea, vomitting and diarrhea episodes, w/ some cough, sore throat and headache, feels symptoms getting worse.  Elevated temperature at this time, Ofirmev IVPB completed, HR improved, other vitals stable.  NSS 1 L bolus completed.  For IVPB Azithromycin and Ceftriaxone.  Able to ambulate w/ steady gait.  For admit.  Isolation ongoing for droplet and contact.  Will continue to monitor.

## 2020-03-19 NOTE — ED ADULT NURSE REASSESSMENT NOTE - NS ED NURSE REASSESS COMMENT FT1
Ceftriaxone completed, Azithromycin IVPB ongoing, NSS 1 L bolus ongoing, no adverse rxn.  Vital signs improved and stable, no c/o of chest pain or SOB.  O2 2 L NC ongoing.  For admit 7 Uris.  ENdorsement report and care received by SUZI Quiros ED Holding RN pending room assignment.

## 2020-03-19 NOTE — H&P ADULT - ATTENDING COMMENTS
patient seen and examined overnight  reviewed pertinent data, h&p     1. sepsis ,r/o COVID: followup ctxs, monitor inflammatory markers; if negative pregnancy test to start plaquenil , c/w azithromycin patient seen and examined overnight  reviewed pertinent data, h&p ; reports exposure to large groups of people at work.     pt tired, uncomfortable appearing on exam, c/o HA; slighly dry MM; +coughing; decreased breath sounds b/l  (poor inspiratory effort 2/2 coughing)       1. sepsis  PNA, ,r/o COVID: followup ctxs  monitor inflammatory markers; highly suspect COVID -19 viral infection; if negative pregnancy test to start plaquenil given risk factors (past hx of asthma, hypoxia, +inflammatory markers), c/w azithromycin for anti-inflammatory effects; patient seen and examined overnight  reviewed pertinent data, h&p ; reports exposure to large groups of people at work. LMP 2 weeks ago     pt tired, uncomfortable appearing on exam, c/o HA; slighly dry MM; +coughing; decreased breath sounds b/l  (poor inspiratory effort 2/2 coughing)       1. sepsis  PNA, ,r/o COVID: followup ctxs  monitor inflammatory markers; highly suspect COVID -19 viral infection; if negative pregnancy test to start plaquenil given risk factors (past hx of asthma, hypoxia, +inflammatory markers), c/w azithromycin for anti-inflammatory effects

## 2020-03-20 DIAGNOSIS — J96.01 ACUTE RESPIRATORY FAILURE WITH HYPOXIA: ICD-10-CM

## 2020-03-20 DIAGNOSIS — J45.909 UNSPECIFIED ASTHMA, UNCOMPLICATED: ICD-10-CM

## 2020-03-20 DIAGNOSIS — J18.9 PNEUMONIA, UNSPECIFIED ORGANISM: ICD-10-CM

## 2020-03-20 LAB
ANION GAP SERPL CALC-SCNC: 12 MMOL/L — SIGNIFICANT CHANGE UP (ref 5–17)
BUN SERPL-MCNC: 4 MG/DL — LOW (ref 7–23)
CALCIUM SERPL-MCNC: 8.2 MG/DL — LOW (ref 8.4–10.5)
CHLORIDE SERPL-SCNC: 106 MMOL/L — SIGNIFICANT CHANGE UP (ref 96–108)
CO2 SERPL-SCNC: 25 MMOL/L — SIGNIFICANT CHANGE UP (ref 22–31)
CREAT SERPL-MCNC: 0.56 MG/DL — SIGNIFICANT CHANGE UP (ref 0.5–1.3)
FERRITIN SERPL-MCNC: 258 NG/ML — HIGH (ref 15–150)
GLUCOSE SERPL-MCNC: 99 MG/DL — SIGNIFICANT CHANGE UP (ref 70–99)
HCG UR QL: NEGATIVE — SIGNIFICANT CHANGE UP
HCT VFR BLD CALC: 43.3 % — SIGNIFICANT CHANGE UP (ref 34.5–45)
HGB BLD-MCNC: 13.9 G/DL — SIGNIFICANT CHANGE UP (ref 11.5–15.5)
MAGNESIUM SERPL-MCNC: 1.8 MG/DL — SIGNIFICANT CHANGE UP (ref 1.6–2.6)
MCHC RBC-ENTMCNC: 29.1 PG — SIGNIFICANT CHANGE UP (ref 27–34)
MCHC RBC-ENTMCNC: 32.1 GM/DL — SIGNIFICANT CHANGE UP (ref 32–36)
MCV RBC AUTO: 90.8 FL — SIGNIFICANT CHANGE UP (ref 80–100)
NRBC # BLD: 0 /100 WBCS — SIGNIFICANT CHANGE UP (ref 0–0)
PLATELET # BLD AUTO: 227 K/UL — SIGNIFICANT CHANGE UP (ref 150–400)
POTASSIUM SERPL-MCNC: 4.2 MMOL/L — SIGNIFICANT CHANGE UP (ref 3.5–5.3)
POTASSIUM SERPL-SCNC: 4.2 MMOL/L — SIGNIFICANT CHANGE UP (ref 3.5–5.3)
RBC # BLD: 4.77 M/UL — SIGNIFICANT CHANGE UP (ref 3.8–5.2)
RBC # FLD: 12.3 % — SIGNIFICANT CHANGE UP (ref 10.3–14.5)
SODIUM SERPL-SCNC: 143 MMOL/L — SIGNIFICANT CHANGE UP (ref 135–145)
WBC # BLD: 5.14 K/UL — SIGNIFICANT CHANGE UP (ref 3.8–10.5)
WBC # FLD AUTO: 5.14 K/UL — SIGNIFICANT CHANGE UP (ref 3.8–10.5)

## 2020-03-20 PROCEDURE — 99233 SBSQ HOSP IP/OBS HIGH 50: CPT | Mod: GC

## 2020-03-20 RX ORDER — ONDANSETRON 8 MG/1
4 TABLET, FILM COATED ORAL EVERY 6 HOURS
Refills: 0 | Status: DISCONTINUED | OUTPATIENT
Start: 2020-03-20 | End: 2020-03-21

## 2020-03-20 RX ORDER — ASCORBIC ACID 60 MG
500 TABLET,CHEWABLE ORAL DAILY
Refills: 0 | Status: DISCONTINUED | OUTPATIENT
Start: 2020-03-20 | End: 2020-03-21

## 2020-03-20 RX ORDER — ONDANSETRON 8 MG/1
1 TABLET, FILM COATED ORAL
Qty: 15 | Refills: 0
Start: 2020-03-20 | End: 2020-03-24

## 2020-03-20 RX ORDER — ALBUTEROL 90 UG/1
2 AEROSOL, METERED ORAL EVERY 6 HOURS
Refills: 0 | Status: DISCONTINUED | OUTPATIENT
Start: 2020-03-20 | End: 2020-03-20

## 2020-03-20 RX ORDER — ALBUTEROL 90 UG/1
2 AEROSOL, METERED ORAL EVERY 6 HOURS
Refills: 0 | Status: DISCONTINUED | OUTPATIENT
Start: 2020-03-20 | End: 2020-03-21

## 2020-03-20 RX ORDER — INFLUENZA VIRUS VACCINE 15; 15; 15; 15 UG/.5ML; UG/.5ML; UG/.5ML; UG/.5ML
0.5 SUSPENSION INTRAMUSCULAR ONCE
Refills: 0 | Status: DISCONTINUED | OUTPATIENT
Start: 2020-03-20 | End: 2020-03-21

## 2020-03-20 RX ORDER — THIAMINE MONONITRATE (VIT B1) 100 MG
100 TABLET ORAL DAILY
Refills: 0 | Status: DISCONTINUED | OUTPATIENT
Start: 2020-03-20 | End: 2020-03-21

## 2020-03-20 RX ORDER — MAGNESIUM SULFATE 500 MG/ML
1 VIAL (ML) INJECTION ONCE
Refills: 0 | Status: COMPLETED | OUTPATIENT
Start: 2020-03-20 | End: 2020-03-20

## 2020-03-20 RX ORDER — ONDANSETRON 8 MG/1
4 TABLET, FILM COATED ORAL ONCE
Refills: 0 | Status: COMPLETED | OUTPATIENT
Start: 2020-03-20 | End: 2020-03-20

## 2020-03-20 RX ADMIN — Medication 650 MILLIGRAM(S): at 14:09

## 2020-03-20 RX ADMIN — Medication 650 MILLIGRAM(S): at 01:56

## 2020-03-20 RX ADMIN — Medication 100 GRAM(S): at 17:14

## 2020-03-20 RX ADMIN — Medication 100 MILLIGRAM(S): at 18:47

## 2020-03-20 RX ADMIN — AZITHROMYCIN 250 MILLIGRAM(S): 500 TABLET, FILM COATED ORAL at 21:31

## 2020-03-20 RX ADMIN — ONDANSETRON 4 MILLIGRAM(S): 8 TABLET, FILM COATED ORAL at 14:08

## 2020-03-20 RX ADMIN — Medication 650 MILLIGRAM(S): at 22:22

## 2020-03-20 RX ADMIN — Medication 650 MILLIGRAM(S): at 15:09

## 2020-03-20 RX ADMIN — Medication 100 MILLIGRAM(S): at 07:53

## 2020-03-20 RX ADMIN — Medication 650 MILLIGRAM(S): at 00:03

## 2020-03-20 RX ADMIN — Medication 650 MILLIGRAM(S): at 07:53

## 2020-03-20 RX ADMIN — Medication 500 MILLIGRAM(S): at 18:47

## 2020-03-20 RX ADMIN — ONDANSETRON 4 MILLIGRAM(S): 8 TABLET, FILM COATED ORAL at 07:52

## 2020-03-20 NOTE — PATIENT PROFILE ADULT - NSPROMUTANXFEARFT_GEN_A_NUR
This writer attempted to contact patient on 10/23/19      Reason for call ACT and left message.      If patient calls back:   2nd floor [place] Care Team (MA/TC) called. Inform patient that someone from the team will contact them, document that pt called and route to care team.         Anat Moreira MA     n/a

## 2020-03-20 NOTE — PROGRESS NOTE ADULT - PROBLEM SELECTOR PLAN 2
Patient has history of childhood asthma, but hasn't used an inhaler in years. She denies ever being on inhaled GCs. She rarely used her inhaler.   - Physical exam without wheezes. Picture consistent lower airway disease vs. asthma.

## 2020-03-20 NOTE — DISCHARGE NOTE PROVIDER - NSDCCPCAREPLAN_GEN_ALL_CORE_FT
PRINCIPAL DISCHARGE DIAGNOSIS  Diagnosis: Infection due to 2019 novel coronavirus  Assessment and Plan of Treatment: There was concern that you may have the novel new coronavirus, also known as COVID-19. This test resulted from a nasal and oral (mouth) swab that was done earlier in your admission to [facility]. Your symptoms improved dramatically during your hospital stay. Should your test result be positive, the treatment is supportive care, which means: rest, hydration, and maintaining a good healthy diet. You may take tylenol if you experience any fevers and Robitussin for cough. If you start experiencing extreme shortness of breath, difficulty breathing resulting in the inability to even walk, please visit an urgent care. Please maintain a 2 week (14 day) quarantine in your apartment until a health care agent calls you with the test results. The department of health will followup with you via phone, please do not go to your PCP while in self quarantine. Wear a mask at all times should you have to be outdoors briefly - and avoid crowds, public spaces, and mass transit at all times during this quarantine. Continue to wash your hands frequently. Please see the supplemented written instructions for further use.   For you cough we have prescribed Tessalon Perles and for your nausea we have prescribed Zofran. Please take as directed. PRINCIPAL DISCHARGE DIAGNOSIS  Diagnosis: Infection due to 2019 novel coronavirus  Assessment and Plan of Treatment: There was concern that you may have the novel new coronavirus, also known as COVID-19. This test resulted from a nasal and oral (mouth) swab that was done earlier in your admission to [facility]. Your symptoms improved dramatically during your hospital stay. Should your test result be positive, the treatment is supportive care, which means: rest, hydration, and maintaining a good healthy diet. You may take tylenol if you experience any fevers and Robitussin for cough. If you start experiencing extreme shortness of breath, difficulty breathing resulting in the inability to even walk, please visit an urgent care. Please maintain a 2 week (14 day) quarantine in your apartment until a health care agent calls you with the test results. The department of health will followup with you via phone, please do not go to your PCP while in self quarantine. Wear a mask at all times should you have to be outdoors briefly - and avoid crowds, public spaces, and mass transit at all times during this quarantine. Continue to wash your hands frequently. Please see the supplemented written instructions for further use.   For your cough we have prescribed Tessalon Perles and for your nausea we have prescribed Zofran. Please take as directed.      SECONDARY DISCHARGE DIAGNOSES  Diagnosis: Asthma  Assessment and Plan of Treatment: PRINCIPAL DISCHARGE DIAGNOSIS  Diagnosis: Infection due to 2019 novel coronavirus  Assessment and Plan of Treatment: There was concern that you may have the novel new coronavirus, also known as COVID-19. This test resulted from a nasal and oral (mouth) swab that was done earlier in your admission to [facility]. Your symptoms improved dramatically during your hospital stay. Should your test result be positive, the treatment is supportive care, which means: rest, hydration, and maintaining a good healthy diet. You may take tylenol if you experience any fevers and Robitussin for cough. If you start experiencing extreme shortness of breath, difficulty breathing resulting in the inability to even walk, please visit an urgent care. Please maintain a 2 week (14 day) quarantine in your apartment until a health care agent calls you with the test results. The department of health will followup with you via phone, please do not go to your PCP while in self quarantine. Wear a mask at all times should you have to be outdoors briefly - and avoid crowds, public spaces, and mass transit at all times during this quarantine. Continue to wash your hands frequently. Please see the supplemented written instructions for further use.   For your cough we have prescribed Tessalon Perles and for your nausea we have prescribed Zofran. Please take as directed.      SECONDARY DISCHARGE DIAGNOSES  Diagnosis: Asthma  Assessment and Plan of Treatment: Asthma is a condition in which your airways narrow and swell and produce extra mucus. This can make breathing difficult and trigger coughing, wheezing and shortness of breath. Asthma can't be cured, but its symptoms can be controlled.  Asthma signs and symptoms include:  Shortness of breath  Chest tightness or pain  Trouble sleeping caused by shortness of breath, coughing or wheezing  A whistling or wheezing sound when exhaling.  Coughing or wheezing attacks that are worsened by a respiratory virus, such as a cold or the flu.  Please track your symptoms and follow up with your doctor.

## 2020-03-20 NOTE — DISCHARGE NOTE PROVIDER - NSDCCPTREATMENT_GEN_ALL_CORE_FT
PRINCIPAL PROCEDURE  Procedure: CT chest wo con  Findings and Treatment: IMPRESSION:  Predominantly peripheral patchy and ground glass consolidations in the lungs bilaterally. These findings may represent  COVID-19. Differential diagnosis includes other viral pneumonias, multifocal pneumonia, organizing pneumonia or atypical pneumonia.   Nonobstructive renal calculus in left kidney.        SECONDARY PROCEDURE  Procedure: CXR, single AP view  Findings and Treatment: IMPRESSION:  Bilateral patchy hazy infiltrates.

## 2020-03-20 NOTE — DISCHARGE NOTE PROVIDER - NSDCFUADDINST_GEN_ALL_CORE_FT
You are advised to self isolate for 14 days.     Please follow up with your primary care provider within 2 weeks of this hospitalization. Please call his office to make an appointment.    Please bring all discharge paperwork to all follow up appointments.

## 2020-03-20 NOTE — PROGRESS NOTE ADULT - SUBJECTIVE AND OBJECTIVE BOX
Interval / Overnight: admitted overnight for COVID requiring O2.     Subjective: Ms. Andrews tells me her night was difficult. She has " a pounding headache...10/10". She also endorses muscles pains, anorexia, nausea, sob, and productive cough.     VITAL SIGNS:  Vital Signs Last 24 Hrs  T(C): 37.1 (20 Mar 2020 06:50), Max: 38.4 (19 Mar 2020 21:08)  T(F): 98.8 (20 Mar 2020 06:50), Max: 101.1 (19 Mar 2020 21:08)  HR: 81 (20 Mar 2020 06:50) (80 - 130)  BP: 115/72 (20 Mar 2020 06:50) (105/70 - 123/83)  BP(mean): --  RR: 20 (20 Mar 2020 06:50) (20 - 22)  SpO2: 100% (20 Mar 2020 06:50) (92% - 100%)    PHYSICAL EXAM:    General: in NAD, lying comfortably in bed, wearing nasal cannula, coughing intermittently   HEENT: normocephalic, atraumatic; PERRL, anicteric sclera; hyperemic conjunctiva, MMM with clear OP  Neck: supple, no thyromegaly, no lymphadenopathy  Cardiovascular: +S1/S2, tachycardic, no M/G/R  Respiratory: Shallow inspirations with intermittent cough. Right middle and lower lung field with inspiratory crackles, left lung clear  Gastrointestinal: soft, NT/ND; +BSx4  Extremities: WWP; no edema   Vascular: 2+ radial BL, 2+ DP BL  Neurological: Alert to person place and time.     MEDICATIONS:  MEDICATIONS  (STANDING):  azithromycin   Tablet 250 milliGRAM(s) Oral every 24 hours  influenza   Vaccine 0.5 milliLiter(s) IntraMuscular once    MEDICATIONS  (PRN):  acetaminophen   Tablet .. 650 milliGRAM(s) Oral every 6 hours PRN Temp greater or equal to 38C (100.4F), Mild Pain (1 - 3)  ALBUTerol    90 MICROgram(s) HFA Inhaler 2 Puff(s) Inhalation every 6 hours PRN Shortness of Breath and/or Wheezing      ALLERGIES:  Allergies    No Known Allergies    Intolerances        LABS:                        13.9   5.14  )-----------( 227      ( 20 Mar 2020 07:14 )             43.3     03-20    143  |  106  |  4<L>  ----------------------------<  99  4.2   |  25  |  0.56    Ca    8.2<L>      20 Mar 2020 07:14  Mg     1.8     03-20    TPro  8.1  /  Alb  4.2  /  TBili  0.5  /  DBili  x   /  AST  33  /  ALT  30  /  AlkPhos  160<H>  03-19        CAPILLARY BLOOD GLUCOSE          RADIOLOGY & ADDITIONAL TESTS: Reviewed.

## 2020-03-20 NOTE — DISCHARGE NOTE PROVIDER - NSDCMRMEDTOKEN_GEN_ALL_CORE_FT
ondansetron 4 mg oral tablet: 1 tab(s) orally 3 times a day, As Needed -for nausea   Tessalon Perles 100 mg oral capsule: 1 cap(s) orally 3 times a day, As Needed -for cough ascorbic acid 500 mg oral tablet: 1 tab(s) orally once a day  ondansetron 4 mg oral tablet: 1 tab(s) orally 3 times a day, As Needed -for nausea   Tessalon Perles 100 mg oral capsule: 1 cap(s) orally 3 times a day, As Needed -for cough   thiamine 100 mg oral tablet: 1 tab(s) orally once a day ascorbic acid 500 mg oral tablet: 1 tab(s) orally once a day  ondansetron 4 mg oral tablet: 1 tab(s) orally 3 times a day, As Needed -for nausea   Tessalon Perles 100 mg oral capsule: 1 cap(s) orally 3 times a day, As Needed -for cough   thiamine 100 mg oral tablet: 1 tab(s) orally once a day  Zithromax 250 mg oral tablet: 1 tab(s) orally every 24 hours

## 2020-03-20 NOTE — DISCHARGE NOTE PROVIDER - CARE PROVIDER_API CALL
Jerry Laboy)  [address] [place] [address]  Phone: [phone]  Fax: (   )    -  Established Patient  Follow Up Time: 2 weeks

## 2020-03-20 NOTE — PROGRESS NOTE ADULT - PROBLEM SELECTOR PLAN 1
presents with 1 week hx of cough, fever, SOB, myalgia, 2 day hx of loose stools; CT chest concerning for COVID-19 given b/l ground glass opacities  - C/w azithromycin (3/19 - 3/23)  - will start plaquenil after urine HCG negative -- still hasn't resulted (3/20)   - COVID 19 pending  - Initial markers:  CRP 9.57 Procal .05. Normal lymphocyte count.   - patient his mild-moderate disease with category 1 risk factor (asthma), will continue supportive care and observation now  - supplemental O2  - Zofran for nausea, tylenol for HA, Tessalon for cough.     # Sepsis on admission   febrile and tachycardic, lactate 1.7. Reperfusion assessment complete, 2L NS bolus.  - Blood cultures received by lab. Will f/u.   - Both CXR and CT suggestive of viral process, but possible atypical pna. Will c/w azithromycin 250mg.

## 2020-03-20 NOTE — DISCHARGE NOTE PROVIDER - PROVIDER TOKENS
FREE:[LAST:[Sure],FIRST:[Jerry POOLE)],PHONE:[phone]],FAX:[(   )    -],ADDRESS:[[address] [place] [address]],FOLLOWUP:[2 weeks],ESTABLISHEDPATIENT:[T]]

## 2020-03-20 NOTE — DISCHARGE NOTE PROVIDER - HOSPITAL COURSE
34F with history of mild intermittent asthma (has not used an inhaler in years, never intubated), presents with 7 day history of dry cough and fever. She was admitted for symptomatic management. She was given supplemental oxygen, zofran, and tessalon perles. Patient was medically optimized, stable and ready for discharge. Plan of care and return precautions were discussed with the patient who verbally stated understanding. 35 y/o F with mild intermittent asthma, presenting with 1 week hx of cough, fever, SOB, myalgia, 2 day hx of loose stools; CT chest with b/l ground glass opacities, admitted for COVID r/o.         Problem List/Main Diagnoses (system-based):         Inpatient treatment course:  34F with history of mild intermittent asthma (has not used an inhaler in years, never intubated), presents with 7 day history of dry cough and fever. She was admitted for symptomatic management, with concern for COVID-19.  She was given supplemental oxygen, zofran, and tessalon perles. Patient was medically optimized, stable and ready for discharge. Plan of care and return precautions were discussed with the patient who verbally stated understanding.         New medications: Tessalon Perles; Zofran.        Labs to be followed outpatient: N/A        Exam to be followed outpatient: Comprehensive physical exam         Dispo: Home (self-isolation) 33 y/o F with mild intermittent asthma, presenting with 1 week hx of cough, fever, SOB, myalgia, 2 day hx of loose stools; CT chest with b/l ground glass opacities, admitted for COVID r/o.         Problem List/Main Diagnoses (system-based):     R/O COVID-19: Patient with 1 week of cough, fever, SOB and two days of loose stools with underlying asthma, met sepsis criteria on admission with fever and tachycardia. CXR with bilateral opacities. CT chest with peripheral ground glass opacities. Lymphocytopenia present with elevation in inflammatory markers. Blood cultures NGTD. Received azithromycin for anti-inflammatory effects. Symptomatic management with zofran for nausea, tylenol for HA, Tessalon for cough. Isolation precaution initiated, patient discharged with self isolation precautions pending COVID-19 PCR results.     Sepsis 2/2 Pneumonia: Patient mets sepsis criteria on admission with fever and tachycardia, likely secondary to lung pathology, concern for COVID-19 vs atypical pneumonia. % day course of azithromycin initiated.     Asthma: Patient with known history of asthma, not utilizing inhaler currently. No wheezing noted on exam, less likely asthma exacerbation.         Inpatient treatment course: 33 y/o F with history of mild intermittent asthma (has not used an inhaler in years, never intubated), presents with 7 day history of dry cough and fever, admitted for COVID r/o. RVP negative. Labs significant for lymphocytopenia, transaminitis, and elevated CRP, LDH, and ferritin, consistent with COVID. Blood cultures no growth to date. CXR with bilateral hazy opacities. CT chest demonstrating predominantly peripheral patchy and ground glass consolidations in the lungs bilaterally, with concern for COVID vs viral vs atypical pneumonia.  5 day course of azithromycin for possible atypical pneumonia and anti-inflammatory effects initiated, to be completed outpatient. Symptomatic management with supplemental oxygen, zofran, and tessalon perles. Patient was medically optimized, stable and ready for discharge. Plan of care and return precautions were discussed with the patient who verbally stated understanding.         New medications:  Azithromycin (2 more days for 5 days abx total); Tessalon Perles; Zofran; Thiamine; Vitamin C        Labs to be followed outpatient: COVID-19 PCR         Exam to be followed outpatient: Comprehensive physical exam         Dispo: Home (self-isolation) 35 y/o F with mild intermittent asthma, presenting with 1 week hx of cough, fever, SOB, myalgia, 2 day hx of loose stools; CT chest with b/l ground glass opacities, admitted for COVID r/o.         Problem List/Main Diagnoses (system-based):     R/O COVID-19: Patient with 1 week of cough, fever, SOB and two days of loose stools with underlying asthma, met sepsis criteria on admission with fever and tachycardia. CXR with bilateral opacities. CT chest with peripheral ground glass opacities. Lymphocytopenia present with elevation in inflammatory markers. Blood cultures NGTD. Received azithromycin for anti-inflammatory effects. Symptomatic management with zofran for nausea, tylenol for HA, Tessalon for cough. Isolation precaution initiated, patient discharged with self isolation precautions pending COVID-19 PCR results.     Sepsis 2/2 Pneumonia: Patient mets sepsis criteria on admission with fever and tachycardia, likely secondary to lung pathology, concern for COVID-19 vs atypical pneumonia. % day course of azithromycin initiated.     Asthma: Patient with known history of asthma, not utilizing inhaler currently. No wheezing noted on exam, less likely asthma exacerbation.         Inpatient treatment course: 35 y/o F with history of mild intermittent asthma (has not used an inhaler in years, never intubated), presents with 7 day history of dry cough and fever, admitted for COVID r/o. RVP negative. Labs significant for lymphocytopenia, transaminitis, and elevated CRP, LDH, and ferritin, consistent with COVID. Blood cultures no growth to date. CXR with bilateral hazy opacities. CT chest demonstrating predominantly peripheral patchy and ground glass consolidations in the lungs bilaterally, with concern for COVID vs viral vs atypical pneumonia.  5 day course of azithromycin for possible atypical pneumonia and anti-inflammatory effects initiated, to be completed outpatient. Symptomatic management with supplemental oxygen, zofran, and tessalon perles. Patient was medically optimized, stable and ready for discharge. Plan of care and return precautions were discussed with the patient who verbally stated understanding.         New medications:  Azithromycin (3 more days for 5 days abx total); Tessalon Perles; Zofran; Thiamine; Vitamin C        Labs to be followed outpatient: COVID-19 PCR         Exam to be followed outpatient: Comprehensive physical exam         Dispo: Home (self-isolation)

## 2020-03-20 NOTE — PROGRESS NOTE ADULT - ASSESSMENT
34F with history of mild intermittent asthma, presents with 1 week hx of cough, fever, SOB, myalgia, 2 day hx of loose stools; CT chest concerning for COVID-19 given b/l ground glass opacities. Admitted for supplemental O2 and symptomatic relief.

## 2020-03-21 VITALS
HEART RATE: 94 BPM | SYSTOLIC BLOOD PRESSURE: 114 MMHG | RESPIRATION RATE: 19 BRPM | DIASTOLIC BLOOD PRESSURE: 68 MMHG | TEMPERATURE: 98 F | OXYGEN SATURATION: 97 %

## 2020-03-21 LAB
ALBUMIN SERPL ELPH-MCNC: 3.8 G/DL — SIGNIFICANT CHANGE UP (ref 3.3–5)
ALP SERPL-CCNC: 165 U/L — HIGH (ref 40–120)
ALT FLD-CCNC: 66 U/L — HIGH (ref 10–45)
ANION GAP SERPL CALC-SCNC: 13 MMOL/L — SIGNIFICANT CHANGE UP (ref 5–17)
AST SERPL-CCNC: 67 U/L — HIGH (ref 10–40)
BASOPHILS # BLD AUTO: 0.01 K/UL — SIGNIFICANT CHANGE UP (ref 0–0.2)
BASOPHILS NFR BLD AUTO: 0.2 % — SIGNIFICANT CHANGE UP (ref 0–2)
BILIRUB SERPL-MCNC: 0.3 MG/DL — SIGNIFICANT CHANGE UP (ref 0.2–1.2)
BUN SERPL-MCNC: 4 MG/DL — LOW (ref 7–23)
CALCIUM SERPL-MCNC: 9 MG/DL — SIGNIFICANT CHANGE UP (ref 8.4–10.5)
CHLORIDE SERPL-SCNC: 106 MMOL/L — SIGNIFICANT CHANGE UP (ref 96–108)
CO2 SERPL-SCNC: 26 MMOL/L — SIGNIFICANT CHANGE UP (ref 22–31)
CREAT SERPL-MCNC: 0.61 MG/DL — SIGNIFICANT CHANGE UP (ref 0.5–1.3)
CRP SERPL-MCNC: 6.74 MG/DL — HIGH (ref 0–0.4)
D DIMER BLD IA.RAPID-MCNC: <150 NG/ML DDU — SIGNIFICANT CHANGE UP
EOSINOPHIL # BLD AUTO: 0.05 K/UL — SIGNIFICANT CHANGE UP (ref 0–0.5)
EOSINOPHIL NFR BLD AUTO: 1.2 % — SIGNIFICANT CHANGE UP (ref 0–6)
FERRITIN SERPL-MCNC: 298 NG/ML — HIGH (ref 15–150)
GLUCOSE SERPL-MCNC: 92 MG/DL — SIGNIFICANT CHANGE UP (ref 70–99)
HCT VFR BLD CALC: 43.9 % — SIGNIFICANT CHANGE UP (ref 34.5–45)
HGB BLD-MCNC: 14.3 G/DL — SIGNIFICANT CHANGE UP (ref 11.5–15.5)
IMM GRANULOCYTES NFR BLD AUTO: 0.5 % — SIGNIFICANT CHANGE UP (ref 0–1.5)
LYMPHOCYTES # BLD AUTO: 1.16 K/UL — SIGNIFICANT CHANGE UP (ref 1–3.3)
LYMPHOCYTES # BLD AUTO: 28.6 % — SIGNIFICANT CHANGE UP (ref 13–44)
MAGNESIUM SERPL-MCNC: 2 MG/DL — SIGNIFICANT CHANGE UP (ref 1.6–2.6)
MCHC RBC-ENTMCNC: 29 PG — SIGNIFICANT CHANGE UP (ref 27–34)
MCHC RBC-ENTMCNC: 32.6 GM/DL — SIGNIFICANT CHANGE UP (ref 32–36)
MCV RBC AUTO: 89 FL — SIGNIFICANT CHANGE UP (ref 80–100)
MONOCYTES # BLD AUTO: 0.34 K/UL — SIGNIFICANT CHANGE UP (ref 0–0.9)
MONOCYTES NFR BLD AUTO: 8.4 % — SIGNIFICANT CHANGE UP (ref 2–14)
NEUTROPHILS # BLD AUTO: 2.47 K/UL — SIGNIFICANT CHANGE UP (ref 1.8–7.4)
NEUTROPHILS NFR BLD AUTO: 61.1 % — SIGNIFICANT CHANGE UP (ref 43–77)
NRBC # BLD: 0 /100 WBCS — SIGNIFICANT CHANGE UP (ref 0–0)
PHOSPHATE SERPL-MCNC: 3.3 MG/DL — SIGNIFICANT CHANGE UP (ref 2.5–4.5)
PLATELET # BLD AUTO: 266 K/UL — SIGNIFICANT CHANGE UP (ref 150–400)
POTASSIUM SERPL-MCNC: 3.6 MMOL/L — SIGNIFICANT CHANGE UP (ref 3.5–5.3)
POTASSIUM SERPL-SCNC: 3.6 MMOL/L — SIGNIFICANT CHANGE UP (ref 3.5–5.3)
PROT SERPL-MCNC: 7 G/DL — SIGNIFICANT CHANGE UP (ref 6–8.3)
RBC # BLD: 4.93 M/UL — SIGNIFICANT CHANGE UP (ref 3.8–5.2)
RBC # FLD: 12.4 % — SIGNIFICANT CHANGE UP (ref 10.3–14.5)
SARS-COV-2 RNA SPEC QL NAA+PROBE: DETECTED
SODIUM SERPL-SCNC: 145 MMOL/L — SIGNIFICANT CHANGE UP (ref 135–145)
WBC # BLD: 4.05 K/UL — SIGNIFICANT CHANGE UP (ref 3.8–10.5)
WBC # FLD AUTO: 4.05 K/UL — SIGNIFICANT CHANGE UP (ref 3.8–10.5)

## 2020-03-21 PROCEDURE — 84295 ASSAY OF SERUM SODIUM: CPT

## 2020-03-21 PROCEDURE — 85027 COMPLETE CBC AUTOMATED: CPT

## 2020-03-21 PROCEDURE — 99285 EMERGENCY DEPT VISIT HI MDM: CPT | Mod: 25

## 2020-03-21 PROCEDURE — 82550 ASSAY OF CK (CPK): CPT

## 2020-03-21 PROCEDURE — 80053 COMPREHEN METABOLIC PANEL: CPT

## 2020-03-21 PROCEDURE — 71045 X-RAY EXAM CHEST 1 VIEW: CPT

## 2020-03-21 PROCEDURE — 85025 COMPLETE CBC W/AUTO DIFF WBC: CPT

## 2020-03-21 PROCEDURE — 81025 URINE PREGNANCY TEST: CPT

## 2020-03-21 PROCEDURE — 82330 ASSAY OF CALCIUM: CPT

## 2020-03-21 PROCEDURE — 80048 BASIC METABOLIC PNL TOTAL CA: CPT

## 2020-03-21 PROCEDURE — 87798 DETECT AGENT NOS DNA AMP: CPT

## 2020-03-21 PROCEDURE — 93005 ELECTROCARDIOGRAM TRACING: CPT

## 2020-03-21 PROCEDURE — 84145 PROCALCITONIN (PCT): CPT

## 2020-03-21 PROCEDURE — 87040 BLOOD CULTURE FOR BACTERIA: CPT

## 2020-03-21 PROCEDURE — 94640 AIRWAY INHALATION TREATMENT: CPT

## 2020-03-21 PROCEDURE — 96365 THER/PROPH/DIAG IV INF INIT: CPT

## 2020-03-21 PROCEDURE — 96375 TX/PRO/DX INJ NEW DRUG ADDON: CPT

## 2020-03-21 PROCEDURE — 87581 M.PNEUMON DNA AMP PROBE: CPT

## 2020-03-21 PROCEDURE — 87635 SARS-COV-2 COVID-19 AMP PRB: CPT

## 2020-03-21 PROCEDURE — 83615 LACTATE (LD) (LDH) ENZYME: CPT

## 2020-03-21 PROCEDURE — 84132 ASSAY OF SERUM POTASSIUM: CPT

## 2020-03-21 PROCEDURE — 85379 FIBRIN DEGRADATION QUANT: CPT

## 2020-03-21 PROCEDURE — 87486 CHLMYD PNEUM DNA AMP PROBE: CPT

## 2020-03-21 PROCEDURE — 87633 RESP VIRUS 12-25 TARGETS: CPT

## 2020-03-21 PROCEDURE — 36415 COLL VENOUS BLD VENIPUNCTURE: CPT

## 2020-03-21 PROCEDURE — 71250 CT THORAX DX C-: CPT

## 2020-03-21 PROCEDURE — 84100 ASSAY OF PHOSPHORUS: CPT

## 2020-03-21 PROCEDURE — 86140 C-REACTIVE PROTEIN: CPT

## 2020-03-21 PROCEDURE — 83605 ASSAY OF LACTIC ACID: CPT

## 2020-03-21 PROCEDURE — 82803 BLOOD GASES ANY COMBINATION: CPT

## 2020-03-21 PROCEDURE — 83735 ASSAY OF MAGNESIUM: CPT

## 2020-03-21 PROCEDURE — 99239 HOSP IP/OBS DSCHRG MGMT >30: CPT | Mod: GC

## 2020-03-21 PROCEDURE — 82728 ASSAY OF FERRITIN: CPT

## 2020-03-21 RX ORDER — AZITHROMYCIN 500 MG/1
1 TABLET, FILM COATED ORAL
Qty: 3 | Refills: 0
Start: 2020-03-21 | End: 2020-03-23

## 2020-03-21 RX ORDER — ASCORBIC ACID 60 MG
1 TABLET,CHEWABLE ORAL
Qty: 0 | Refills: 0 | DISCHARGE
Start: 2020-03-21

## 2020-03-21 RX ORDER — THIAMINE MONONITRATE (VIT B1) 100 MG
1 TABLET ORAL
Qty: 3 | Refills: 0
Start: 2020-03-21 | End: 2020-03-23

## 2020-03-21 RX ORDER — ASCORBIC ACID 60 MG
1 TABLET,CHEWABLE ORAL
Qty: 30 | Refills: 0
Start: 2020-03-21 | End: 2020-04-19

## 2020-03-21 RX ORDER — THIAMINE MONONITRATE (VIT B1) 100 MG
1 TABLET ORAL
Qty: 0 | Refills: 0 | DISCHARGE
Start: 2020-03-21

## 2020-03-21 RX ORDER — ONDANSETRON 8 MG/1
1 TABLET, FILM COATED ORAL
Qty: 30 | Refills: 0
Start: 2020-03-21 | End: 2020-03-30

## 2020-03-21 RX ADMIN — Medication 500 MILLIGRAM(S): at 14:26

## 2020-03-21 RX ADMIN — ALBUTEROL 2 PUFF(S): 90 AEROSOL, METERED ORAL at 14:26

## 2020-03-21 RX ADMIN — Medication 650 MILLIGRAM(S): at 00:27

## 2020-03-21 RX ADMIN — Medication 100 MILLIGRAM(S): at 15:22

## 2020-03-21 RX ADMIN — ALBUTEROL 2 PUFF(S): 90 AEROSOL, METERED ORAL at 09:07

## 2020-03-21 RX ADMIN — Medication 650 MILLIGRAM(S): at 09:08

## 2020-03-21 RX ADMIN — Medication 650 MILLIGRAM(S): at 10:04

## 2020-03-21 NOTE — PROGRESS NOTE ADULT - SUBJECTIVE AND OBJECTIVE BOX
**Incomplete Note**    INTERVAL HPI/OVERNIGHT EVENTS:  Patient was seen and examined at bedside. As per nurse and patient, no o/n events, patient resting comfortably. No complaints at this time. Patient denies: fever, chills, dizziness, weakness, HA, Changes in vision, CP, palpitations, SOB, cough, N/V/D/C, dysuria, changes in bowel movements, LE edema. ROS otherwise negative.    VITAL SIGNS:  T(F): 99.2 (03-20-20 @ 22:34)  HR: 90 (03-20-20 @ 22:34)  BP: 118/85 (03-20-20 @ 22:34)  RR: 18 (03-20-20 @ 22:34)  SpO2: 97% (03-20-20 @ 22:34)  Wt(kg): --        PHYSICAL EXAM:    Constitutional: WDWN resting comfortably in bed; NAD  HEENT: NC/AT, PERRL, EOMI, anicteric sclera, no nasal discharge; uvula midline, no oropharyngeal erythema or exudates; MMM  Neck: supple; no JVD or thyromegaly  Respiratory: CTA B/L; no W/R/R, no retractions  Cardiac: +S1/S2; RRR; no M/R/G; PMI non-displaced  Gastrointestinal: soft, NT/ND; no rebound or guarding; +BSx4  Genitourinary: normal external genitalia  Back: spine midline, no bony tenderness or step-offs; no CVAT B/L  Extremities: WWP, no clubbing or cyanosis; no peripheral edema  Musculoskeletal: NROM x4; no joint swelling, tenderness or erythema  Vascular: 2+ radial, DP/PT pulses B/L  Dermatologic: skin warm, dry and intact; no rashes, wounds, or scars  Lymphatic: no submandibular or cervical LAD  Neurologic: AAOx3; CNII-XII grossly intact; no focal deficits  Psychiatric: affect and characteristics of appearance, verbalizations, behaviors are appropriate, denies SI/HI/AH/VH    MEDICATIONS  (STANDING):  ascorbic acid 500 milliGRAM(s) Oral daily  azithromycin   Tablet 250 milliGRAM(s) Oral every 24 hours  influenza   Vaccine 0.5 milliLiter(s) IntraMuscular once  thiamine 100 milliGRAM(s) Oral daily    MEDICATIONS  (PRN):  acetaminophen   Tablet .. 650 milliGRAM(s) Oral every 6 hours PRN Temp greater or equal to 38C (100.4F), Mild Pain (1 - 3)  ALBUTerol    90 MICROgram(s) HFA Inhaler 2 Puff(s) Inhalation every 6 hours PRN Shortness of Breath and/or Wheezing  ondansetron   Disintegrating Tablet 4 milliGRAM(s) Oral every 6 hours PRN Nausea and/or Vomiting      Allergies    No Known Allergies    Intolerances        LABS:                        13.9   5.14  )-----------( 227      ( 20 Mar 2020 07:14 )             43.3     03-20    143  |  106  |  4<L>  ----------------------------<  99  4.2   |  25  |  0.56    Ca    8.2<L>      20 Mar 2020 07:14  Mg     1.8     03-20    TPro  8.1  /  Alb  4.2  /  TBili  0.5  /  DBili  x   /  AST  33  /  ALT  30  /  AlkPhos  160<H>  03-19          RADIOLOGY & ADDITIONAL TESTS:  Reviewed **Incomplete Note**    INTERVAL HPI/OVERNIGHT EVENTS:  Patient was seen and examined at bedside. As per nurse and patient, no o/n events, patient resting comfortably. No complaints at this time. Patient denies: fever, chills, dizziness, weakness, HA, Changes in vision, CP, palpitations, SOB, cough, N/V/D/C, dysuria, changes in bowel movements, LE edema. ROS otherwise negative.    VITAL SIGNS:  T(F): 99.2 (03-20-20 @ 22:34)  HR: 90 (03-20-20 @ 22:34)  BP: 118/85 (03-20-20 @ 22:34)  RR: 18 (03-20-20 @ 22:34)  SpO2: 97% (03-20-20 @ 22:34)  Wt(kg): --        PHYSICAL EXAM:    General: in NAD, lying comfortably in bed, wearing nasal cannula, coughing intermittently   HEENT: normocephalic, atraumatic; PERRL, anicteric sclera; hyperemic conjunctiva, MMM with clear OP  Neck: supple, no thyromegaly, no lymphadenopathy  Cardiovascular: +S1/S2, tachycardic, no M/G/R  Respiratory: Shallow inspirations with intermittent cough. Right middle and lower lung field with inspiratory crackles, left lung clear  Gastrointestinal: soft, NT/ND; +BSx4  Extremities: WWP; no edema   Vascular: 2+ radial BL, 2+ DP BL  Neurological: Alert to person place and time.     MEDICATIONS  (STANDING):  ascorbic acid 500 milliGRAM(s) Oral daily  azithromycin   Tablet 250 milliGRAM(s) Oral every 24 hours  influenza   Vaccine 0.5 milliLiter(s) IntraMuscular once  thiamine 100 milliGRAM(s) Oral daily    MEDICATIONS  (PRN):  acetaminophen   Tablet .. 650 milliGRAM(s) Oral every 6 hours PRN Temp greater or equal to 38C (100.4F), Mild Pain (1 - 3)  ALBUTerol    90 MICROgram(s) HFA Inhaler 2 Puff(s) Inhalation every 6 hours PRN Shortness of Breath and/or Wheezing  ondansetron   Disintegrating Tablet 4 milliGRAM(s) Oral every 6 hours PRN Nausea and/or Vomiting      Allergies    No Known Allergies    Intolerances        LABS:                        13.9   5.14  )-----------( 227      ( 20 Mar 2020 07:14 )             43.3     03-20    143  |  106  |  4<L>  ----------------------------<  99  4.2   |  25  |  0.56    Ca    8.2<L>      20 Mar 2020 07:14  Mg     1.8     03-20    TPro  8.1  /  Alb  4.2  /  TBili  0.5  /  DBili  x   /  AST  33  /  ALT  30  /  AlkPhos  160<H>  03-19          RADIOLOGY & ADDITIONAL TESTS:  Reviewed

## 2020-03-21 NOTE — DISCHARGE NOTE NURSING/CASE MANAGEMENT/SOCIAL WORK - PATIENT PORTAL LINK FT
You can access the FollowMyHealth Patient Portal offered by [facility] by registering at the following website: [email or web address]. By joining [employer]’s FollowMyHealth portal, you will also be able to view your health information using other applications (apps) compatible with our system.

## 2020-09-04 NOTE — CONSULT NOTE ADULT - CONSULT REQUESTED BY NAME
Tiesha

## 2022-02-24 ENCOUNTER — EMERGENCY (EMERGENCY)
Facility: HOSPITAL | Age: 37
LOS: 1 days | Discharge: HOME | End: 2022-02-24
Attending: STUDENT IN AN ORGANIZED HEALTH CARE EDUCATION/TRAINING PROGRAM
Payer: MEDICAID

## 2022-02-24 VITALS
OXYGEN SATURATION: 100 % | TEMPERATURE: 99 F | DIASTOLIC BLOOD PRESSURE: 82 MMHG | WEIGHT: 134.04 LBS | HEART RATE: 80 BPM | RESPIRATION RATE: 16 BRPM | SYSTOLIC BLOOD PRESSURE: 120 MMHG

## 2022-02-24 LAB
ALBUMIN SERPL ELPH-MCNC: 3.5 G/DL — SIGNIFICANT CHANGE UP (ref 3.5–5)
ALP SERPL-CCNC: 75 U/L — SIGNIFICANT CHANGE UP (ref 40–120)
ALT FLD-CCNC: 45 U/L DA — SIGNIFICANT CHANGE UP (ref 10–60)
ANION GAP SERPL CALC-SCNC: 4 MMOL/L — LOW (ref 5–17)
APPEARANCE UR: CLEAR — SIGNIFICANT CHANGE UP
AST SERPL-CCNC: 28 U/L — SIGNIFICANT CHANGE UP (ref 10–40)
BACTERIA # UR AUTO: ABNORMAL /HPF
BASOPHILS # BLD AUTO: 0.02 K/UL — SIGNIFICANT CHANGE UP (ref 0–0.2)
BASOPHILS NFR BLD AUTO: 0.2 % — SIGNIFICANT CHANGE UP (ref 0–2)
BILIRUB SERPL-MCNC: 0.3 MG/DL — SIGNIFICANT CHANGE UP (ref 0.2–1.2)
BILIRUB UR-MCNC: NEGATIVE — SIGNIFICANT CHANGE UP
BUN SERPL-MCNC: 13 MG/DL — SIGNIFICANT CHANGE UP (ref 7–18)
CALCIUM SERPL-MCNC: 8.8 MG/DL — SIGNIFICANT CHANGE UP (ref 8.4–10.5)
CHLORIDE SERPL-SCNC: 105 MMOL/L — SIGNIFICANT CHANGE UP (ref 96–108)
CO2 SERPL-SCNC: 28 MMOL/L — SIGNIFICANT CHANGE UP (ref 22–31)
COLOR SPEC: YELLOW — SIGNIFICANT CHANGE UP
CREAT SERPL-MCNC: 0.88 MG/DL — SIGNIFICANT CHANGE UP (ref 0.5–1.3)
DIFF PNL FLD: NEGATIVE — SIGNIFICANT CHANGE UP
EOSINOPHIL # BLD AUTO: 0.04 K/UL — SIGNIFICANT CHANGE UP (ref 0–0.5)
EOSINOPHIL NFR BLD AUTO: 0.4 % — SIGNIFICANT CHANGE UP (ref 0–6)
EPI CELLS # UR: ABNORMAL /HPF
GLUCOSE SERPL-MCNC: 100 MG/DL — HIGH (ref 70–99)
GLUCOSE UR QL: NEGATIVE — SIGNIFICANT CHANGE UP
HCG SERPL-ACNC: 8750 MIU/ML — HIGH
HCT VFR BLD CALC: 42.5 % — SIGNIFICANT CHANGE UP (ref 34.5–45)
HGB BLD-MCNC: 14 G/DL — SIGNIFICANT CHANGE UP (ref 11.5–15.5)
IMM GRANULOCYTES NFR BLD AUTO: 0.3 % — SIGNIFICANT CHANGE UP (ref 0–1.5)
KETONES UR-MCNC: ABNORMAL
LEUKOCYTE ESTERASE UR-ACNC: ABNORMAL
LIDOCAIN IGE QN: 175 U/L — SIGNIFICANT CHANGE UP (ref 73–393)
LYMPHOCYTES # BLD AUTO: 1.51 K/UL — SIGNIFICANT CHANGE UP (ref 1–3.3)
LYMPHOCYTES # BLD AUTO: 16 % — SIGNIFICANT CHANGE UP (ref 13–44)
MCHC RBC-ENTMCNC: 29.6 PG — SIGNIFICANT CHANGE UP (ref 27–34)
MCHC RBC-ENTMCNC: 32.9 GM/DL — SIGNIFICANT CHANGE UP (ref 32–36)
MCV RBC AUTO: 89.9 FL — SIGNIFICANT CHANGE UP (ref 80–100)
MONOCYTES # BLD AUTO: 0.68 K/UL — SIGNIFICANT CHANGE UP (ref 0–0.9)
MONOCYTES NFR BLD AUTO: 7.2 % — SIGNIFICANT CHANGE UP (ref 2–14)
NEUTROPHILS # BLD AUTO: 7.17 K/UL — SIGNIFICANT CHANGE UP (ref 1.8–7.4)
NEUTROPHILS NFR BLD AUTO: 75.9 % — SIGNIFICANT CHANGE UP (ref 43–77)
NITRITE UR-MCNC: NEGATIVE — SIGNIFICANT CHANGE UP
NRBC # BLD: 0 /100 WBCS — SIGNIFICANT CHANGE UP (ref 0–0)
PH UR: 6.5 — SIGNIFICANT CHANGE UP (ref 5–8)
PLATELET # BLD AUTO: 347 K/UL — SIGNIFICANT CHANGE UP (ref 150–400)
POTASSIUM SERPL-MCNC: 3.9 MMOL/L — SIGNIFICANT CHANGE UP (ref 3.5–5.3)
POTASSIUM SERPL-SCNC: 3.9 MMOL/L — SIGNIFICANT CHANGE UP (ref 3.5–5.3)
PROT SERPL-MCNC: 6.9 G/DL — SIGNIFICANT CHANGE UP (ref 6–8.3)
PROT UR-MCNC: 15
RBC # BLD: 4.73 M/UL — SIGNIFICANT CHANGE UP (ref 3.8–5.2)
RBC # FLD: 12.7 % — SIGNIFICANT CHANGE UP (ref 10.3–14.5)
RBC CASTS # UR COMP ASSIST: SIGNIFICANT CHANGE UP /HPF (ref 0–2)
SODIUM SERPL-SCNC: 137 MMOL/L — SIGNIFICANT CHANGE UP (ref 135–145)
SP GR SPEC: 1.01 — SIGNIFICANT CHANGE UP (ref 1.01–1.02)
UROBILINOGEN FLD QL: NEGATIVE — SIGNIFICANT CHANGE UP
WBC # BLD: 9.45 K/UL — SIGNIFICANT CHANGE UP (ref 3.8–10.5)
WBC # FLD AUTO: 9.45 K/UL — SIGNIFICANT CHANGE UP (ref 3.8–10.5)
WBC UR QL: SIGNIFICANT CHANGE UP /HPF (ref 0–5)

## 2022-02-24 PROCEDURE — 76856 US EXAM PELVIC COMPLETE: CPT | Mod: 26

## 2022-02-24 PROCEDURE — 76830 TRANSVAGINAL US NON-OB: CPT | Mod: 26

## 2022-02-24 PROCEDURE — 99285 EMERGENCY DEPT VISIT HI MDM: CPT

## 2022-02-24 RX ORDER — MORPHINE SULFATE 50 MG/1
4 CAPSULE, EXTENDED RELEASE ORAL ONCE
Refills: 0 | Status: DISCONTINUED | OUTPATIENT
Start: 2022-02-24 | End: 2022-02-24

## 2022-02-24 RX ADMIN — MORPHINE SULFATE 4 MILLIGRAM(S): 50 CAPSULE, EXTENDED RELEASE ORAL at 21:51

## 2022-02-24 RX ADMIN — MORPHINE SULFATE 4 MILLIGRAM(S): 50 CAPSULE, EXTENDED RELEASE ORAL at 23:00

## 2022-02-24 NOTE — ED PROVIDER NOTE - PROGRESS NOTE DETAILS
Malena: ct abdomen unremarkable. with moderate stool burden. US with SAMANTHA. no obvious cause of pain- although pt states she did have this same pain before 2/2 pregnancy and it only improved with termination. will dc with pain meds. advised to continue PPID medications. pt to f/u with OBGYN today. return precautions discussed.

## 2022-02-24 NOTE — ED PROVIDER NOTE - CLINICAL SUMMARY MEDICAL DECISION MAKING FREE TEXT BOX
36 year old female with PMHx of ectopic pregnancy and PSHx of left oophorectomy presents to the ED with left lower pain for three days. TV ultrasound demonstrated early intrauterine gestation with gestational sac and yolk sac. Patient was informed of her pregnancy, and states she does not desire her pregnancy and prefers to evaluate for extrauterine etiologies of her pain.  CT A/P will be performed. Will order pain medications and reassess.

## 2022-02-24 NOTE — ED ADULT NURSE NOTE - NS ED PATIENT SAFETY CONCERN
From: Paras Ruby  To: Jason Russell  Sent: 7/30/2021 11:36 AM CDT  Subject: Upcoming Appointment    Hi Dr. LEWIS!     I saw the results from the sleep study. I'm going to make an appointment with Dr. Ramires then.    Do I need to make an appointment with you about the testosterone replacement and was there another smoking cessation product I could try or wait for chantix to come back?     Thank you and have a great weekend!    Keith   No

## 2022-02-24 NOTE — ED PROVIDER NOTE - NSFOLLOWUPINSTRUCTIONS_ED_ALL_ED_FT
Log Out.      [employer]     :  [facility]  	                       ABDOMINAL PAIN IN PREGNANCY - AfterCare(R) Instructions(ER/ED)           Abdominal Pain in Pregnancy    WHAT YOU NEED TO KNOW:    Abdominal pain during pregnancy is common. Some of the causes include heartburn, constipation, gas, false labor, and round ligament pain. Round ligament pain is caused by stretching of the ligaments that support your uterus. Abdominal pain may be caused by a health problem, such as a stomach virus or appendicitis (inflammation of the appendix). The pain may also be caused by a problem with your pregnancy, such as a threatened miscarriage or  labor.    DISCHARGE INSTRUCTIONS:    Call your local emergency number (911 in the [place]) if:   •You have a fast heartbeat.      •You have shortness of breath.      •You feel lightheaded or faint.      Return to the emergency department if:   •You have sudden, severe pain or cramps that are so bad that you cannot walk or talk.      •You have vaginal bleeding or discharge.      •You have nausea, vomiting, fever, and severe pain on your right side.      Call your obstetrician if:   •You have light vaginal bleeding or spotting.      •You continue to have abdominal pain that cannot be relieved.      •You have a fever.      •You have questions or concerns about your condition or care.      Medicines: Ask your healthcare provider before you take any medicine during pregnancy, including over-the-counter pain medicines.  •Acetaminophen may be recommended. Ask how much to take and how often to take it. Follow directions. Acetaminophen can cause liver damage if not taken correctly. Do not use more than 4 grams (4,000 milligrams) total of acetaminophen in 1 day. Acetaminophen can cause liver damage. Your provider will tell you how much is safe to take each day during pregnancy. Too much medicine can be harmful to your baby. Read the labels of all other medicines you are using to see if they also contain acetaminophen, or ask your doctor or pharmacist.      •Take your medicine as directed. Contact your healthcare provider if you think your medicine is not helping or if you have side effects. Tell him or her if you are allergic to any medicine. Keep a list of the medicines, vitamins, and herbs you take. Include the amounts, and when and why you take them. Bring the list or the pill bottles to follow-up visits. Carry your medicine list with you in case of an emergency.      Self-care:   •Rest as needed. Rest may help to relieve pain. Your healthcare provider may recommend that you rest on your side instead of on your back. He or she may tell you to lie on your left side, if possible. Place a pillow under your abdomen. Keep another pillow between your knees. Ask your provider about other ways to relieve this pain, such as a supportive belt or pregnancy exercises.      •Do not lie flat in bed or bend over if you have heartburn. Ask your obstetrician if you should make any changes to the foods you eat. Ask if you can take any medicines for heartburn.  Prevent GERD           •Move slowly. Avoid quick changes in position or movements that cause pain.      •Exercise as directed. Gentle exercise can keep the ligaments loose and strengthen core (abdominal) muscles. An example is swimming, or a yoga program designed for pregnancy. Ask your healthcare provider which exercises are safe for you and how often to exercise. For most healthy women, a good goal is to try to get at least 30 minutes of exercise every day. If activity causes pain, try not to walk too long or too far at one time. Break your exercise up into short amounts.  Walking During Pregnancy           •Apply a warm compress to the area. Warmth can relieve pain and muscle spasms. Ask your healthcare provider if you can take a warm bath or use a heating pad. Keep all heat settings low. High heat can be dangerous for your baby. Do not sit in a hot tub or use hot water in your bath. You may also be able to massage the area gently while you are applying heat. Massage can help relieve pain.      •Eat more fiber and drink more liquids to relieve constipation. Fiber is found in fruits, vegetables, and whole-grain foods, such as whole-wheat bread and cereals. Ask how much liquid to drink each day and which liquids are best for you.             Follow up with your obstetrician within 3 days or as directed: Write down your questions so you remember to ask them during your visits.       © Copyright [employer]            back to top                          © Copyright [employer]

## 2022-02-24 NOTE — ED PROVIDER NOTE - PATIENT PORTAL LINK FT
You can access the FollowMyHealth Patient Portal offered by [facility] by registering at the following website: [email or web address]. By joining [employer]’s FollowMyHealth portal, you will also be able to view your health information using other applications (apps) compatible with our system.

## 2022-02-25 VITALS
HEART RATE: 72 BPM | TEMPERATURE: 98 F | RESPIRATION RATE: 18 BRPM | SYSTOLIC BLOOD PRESSURE: 125 MMHG | OXYGEN SATURATION: 99 % | DIASTOLIC BLOOD PRESSURE: 85 MMHG

## 2022-02-25 DIAGNOSIS — Z90.721 ACQUIRED ABSENCE OF OVARIES, UNILATERAL: Chronic | ICD-10-CM

## 2022-02-25 PROCEDURE — 87086 URINE CULTURE/COLONY COUNT: CPT

## 2022-02-25 PROCEDURE — 74177 CT ABD & PELVIS W/CONTRAST: CPT | Mod: MA

## 2022-02-25 PROCEDURE — 76856 US EXAM PELVIC COMPLETE: CPT

## 2022-02-25 PROCEDURE — 74177 CT ABD & PELVIS W/CONTRAST: CPT | Mod: 26,MA

## 2022-02-25 PROCEDURE — 96374 THER/PROPH/DIAG INJ IV PUSH: CPT | Mod: XU

## 2022-02-25 PROCEDURE — 99284 EMERGENCY DEPT VISIT MOD MDM: CPT | Mod: 25

## 2022-02-25 PROCEDURE — 80053 COMPREHEN METABOLIC PANEL: CPT

## 2022-02-25 PROCEDURE — 36415 COLL VENOUS BLD VENIPUNCTURE: CPT

## 2022-02-25 PROCEDURE — 96376 TX/PRO/DX INJ SAME DRUG ADON: CPT

## 2022-02-25 PROCEDURE — 76830 TRANSVAGINAL US NON-OB: CPT

## 2022-02-25 PROCEDURE — 83690 ASSAY OF LIPASE: CPT

## 2022-02-25 PROCEDURE — 81001 URINALYSIS AUTO W/SCOPE: CPT

## 2022-02-25 PROCEDURE — 96375 TX/PRO/DX INJ NEW DRUG ADDON: CPT

## 2022-02-25 PROCEDURE — 85025 COMPLETE CBC W/AUTO DIFF WBC: CPT

## 2022-02-25 PROCEDURE — 84702 CHORIONIC GONADOTROPIN TEST: CPT

## 2022-02-25 RX ORDER — MORPHINE SULFATE 50 MG/1
4 CAPSULE, EXTENDED RELEASE ORAL ONCE
Refills: 0 | Status: DISCONTINUED | OUTPATIENT
Start: 2022-02-25 | End: 2022-02-25

## 2022-02-25 RX ORDER — KETOROLAC TROMETHAMINE 30 MG/ML
30 SYRINGE (ML) INJECTION ONCE
Refills: 0 | Status: DISCONTINUED | OUTPATIENT
Start: 2022-02-25 | End: 2022-02-25

## 2022-02-25 RX ORDER — OXYCODONE AND ACETAMINOPHEN 5; 325 MG/1; MG/1
1 TABLET ORAL
Qty: 12 | Refills: 0
Start: 2022-02-25 | End: 2022-02-27

## 2022-02-25 RX ORDER — IBUPROFEN 200 MG
1 TABLET ORAL
Qty: 21 | Refills: 0
Start: 2022-02-25 | End: 2022-03-03

## 2022-02-25 RX ORDER — POLYETHYLENE GLYCOL 3350 17 G/17G
17 POWDER, FOR SOLUTION ORAL
Qty: 119 | Refills: 0
Start: 2022-02-25 | End: 2022-03-03

## 2022-02-25 RX ORDER — MORPHINE SULFATE 50 MG/1
6 CAPSULE, EXTENDED RELEASE ORAL ONCE
Refills: 0 | Status: DISCONTINUED | OUTPATIENT
Start: 2022-02-25 | End: 2022-02-25

## 2022-02-25 RX ADMIN — Medication 30 MILLIGRAM(S): at 04:00

## 2022-02-25 RX ADMIN — MORPHINE SULFATE 4 MILLIGRAM(S): 50 CAPSULE, EXTENDED RELEASE ORAL at 00:53

## 2022-02-25 RX ADMIN — Medication 30 MILLIGRAM(S): at 04:30

## 2022-02-25 RX ADMIN — MORPHINE SULFATE 4 MILLIGRAM(S): 50 CAPSULE, EXTENDED RELEASE ORAL at 01:23

## 2022-02-25 RX ADMIN — MORPHINE SULFATE 6 MILLIGRAM(S): 50 CAPSULE, EXTENDED RELEASE ORAL at 04:00

## 2022-02-25 RX ADMIN — MORPHINE SULFATE 6 MILLIGRAM(S): 50 CAPSULE, EXTENDED RELEASE ORAL at 04:30

## 2022-02-26 LAB
CULTURE RESULTS: NO GROWTH — SIGNIFICANT CHANGE UP
SPECIMEN SOURCE: SIGNIFICANT CHANGE UP

## 2023-02-05 NOTE — DISCHARGE NOTE PROVIDER - NSDCQMCOGNITION_NEU_ALL_CORE
I have personally seen and examined the patient. I have collaborated with and supervised the
No difficulties
sudden onset

## 2025-01-18 NOTE — ED ADULT NURSE NOTE - OBJECTIVE STATEMENT
52
pt woke with pain under her left breast wrapping around to her back and down the front of her abd.  pain worsens with deep breath.  no sob.  no cough.   no recent travel.  no trauma.  also c/o pain to lt. groin, denies renal calculi, or other hx, reports that she took a naprosyn at home, with no relief

## 2025-05-20 NOTE — ED ADULT NURSE NOTE - NS ED NURSE RECORD ANOTHER HT AND WT
Detail Level: Detailed Benzoyl Peroxide Counseling: Patient counseled that medicine may cause skin irritation and bleach clothing.  In the event of skin irritation, the patient was advised to reduce the amount of the drug applied or use it less frequently.   The patient verbalized understanding of the proper use and possible adverse effects of benzoyl peroxide.  All of the patient's questions and concerns were addressed. Azelaic Acid Counseling: Patient counseled that medicine may cause skin irritation and to avoid applying near the eyes.  In the event of skin irritation, the patient was advised to reduce the amount of the drug applied or use it less frequently.   The patient verbalized understanding of the proper use and possible adverse effects of azelaic acid.  All of the patient's questions and concerns were addressed. Tazorac Counseling:  Patient advised that medication is irritating and drying.  Patient may need to apply sparingly and wash off after an hour before eventually leaving it on overnight.  The patient verbalized understanding of the proper use and possible adverse effects of tazorac.  All of the patient's questions and concerns were addressed. Topical Retinoid counseling:  Patient advised to apply a pea-sized amount only at bedtime and wait 30 minutes after washing their face before applying.  If too drying, patient may add a non-comedogenic moisturizer. The patient verbalized understanding of the proper use and possible adverse effects of retinoids.  All of the patient's questions and concerns were addressed. Topical Sulfur Applications Counseling: Topical Sulfur Counseling: Patient counseled that this medication may cause skin irritation or allergic reactions.  In the event of skin irritation, the patient was advised to reduce the amount of the drug applied or use it less frequently.   The patient verbalized understanding of the proper use and possible adverse effects of topical sulfur application.  All of the patient's questions and concerns were addressed. Isotretinoin Counseling: Patient should get monthly blood tests, not donate blood, not drive at night if vision affected, not share medication, and not undergo elective surgery for 6 months after tx completed. Side effects reviewed, pt to contact office should one occur. Use Enhanced Medication Counseling?: No Sarecycline Counseling: Patient advised regarding possible photosensitivity and discoloration of the teeth, skin, lips, tongue and gums.  Patient instructed to avoid sunlight, if possible.  When exposed to sunlight, patients should wear protective clothing, sunglasses, and sunscreen.  The patient was instructed to call the office immediately if the following severe adverse effects occur:  hearing changes, easy bruising/bleeding, severe headache, or vision changes.  The patient verbalized understanding of the proper use and possible adverse effects of sarecycline.  All of the patient's questions and concerns were addressed. Aklief counseling:  Patient advised to apply a pea-sized amount only at bedtime and wait 30 minutes after washing their face before applying.  If too drying, patient may add a non-comedogenic moisturizer.  The most commonly reported side effects including irritation, redness, scaling, dryness, stinging, burning, itching, and increased risk of sunburn.  The patient verbalized understanding of the proper use and possible adverse effects of retinoids.  All of the patient's questions and concerns were addressed. Minocycline Counseling: Patient advised regarding possible photosensitivity and discoloration of the teeth, skin, lips, tongue and gums.  Patient instructed to avoid sunlight, if possible.  When exposed to sunlight, patients should wear protective clothing, sunglasses, and sunscreen.  The patient was instructed to call the office immediately if the following severe adverse effects occur:  hearing changes, easy bruising/bleeding, severe headache, or vision changes.  The patient verbalized understanding of the proper use and possible adverse effects of minocycline.  All of the patient's questions and concerns were addressed. Tetracycline Counseling: Patient counseled regarding possible photosensitivity and increased risk for sunburn.  Patient instructed to avoid sunlight, if possible.  When exposed to sunlight, patients should wear protective clothing, sunglasses, and sunscreen.  The patient was instructed to call the office immediately if the following severe adverse effects occur:  hearing changes, easy bruising/bleeding, severe headache, or vision changes.  The patient verbalized understanding of the proper use and possible adverse effects of tetracycline.  All of the patient's questions and concerns were addressed. Patient understands to avoid pregnancy while on therapy due to potential birth defects. Spironolactone Counseling: Patient advised regarding risks of diarrhea, abdominal pain, hyperkalemia, birth defects (for female patients), liver toxicity and renal toxicity. The patient may need blood work to monitor liver and kidney function and potassium levels while on therapy. The patient verbalized understanding of the proper use and possible adverse effects of spironolactone.  All of the patient's questions and concerns were addressed. Birth Control Pills Counseling: Birth Control Pill Counseling: I discussed with the patient the potential side effects of OCPs including but not limited to increased risk of stroke, heart attack, thrombophlebitis, deep venous thrombosis, hepatic adenomas, breast changes, GI upset, headaches, and depression.  The patient verbalized understanding of the proper use and possible adverse effects of OCPs. All of the patient's questions and concerns were addressed. Azithromycin Counseling:  I discussed with the patient the risks of azithromycin including but not limited to GI upset, allergic reaction, drug rash, diarrhea, and yeast infections. Yes Bactrim Counseling:  I discussed with the patient the risks of sulfa antibiotics including but not limited to GI upset, allergic reaction, drug rash, diarrhea, dizziness, photosensitivity, and yeast infections.  Rarely, more serious reactions can occur including but not limited to aplastic anemia, agranulocytosis, methemoglobinemia, blood dyscrasias, liver or kidney failure, lung infiltrates or desquamative/blistering drug rashes. Erythromycin Counseling:  I discussed with the patient the risks of erythromycin including but not limited to GI upset, allergic reaction, drug rash, diarrhea, increase in liver enzymes, and yeast infections. Dapsone Counseling: I discussed with the patient the risks of dapsone including but not limited to hemolytic anemia, agranulocytosis, rashes, methemoglobinemia, kidney failure, peripheral neuropathy, headaches, GI upset, and liver toxicity.  Patients who start dapsone require monitoring including baseline LFTs and weekly CBCs for the first month, then every month thereafter.  The patient verbalized understanding of the proper use and possible adverse effects of dapsone.  All of the patient's questions and concerns were addressed. High Dose Vitamin A Counseling: Side effects reviewed, pt to contact office should one occur. Doxycycline Counseling:  Patient counseled regarding possible photosensitivity and increased risk for sunburn.  Patient instructed to avoid sunlight, if possible.  When exposed to sunlight, patients should wear protective clothing, sunglasses, and sunscreen.  The patient was instructed to call the office immediately if the following severe adverse effects occur:  hearing changes, easy bruising/bleeding, severe headache, or vision changes.  The patient verbalized understanding of the proper use and possible adverse effects of doxycycline.  All of the patient's questions and concerns were addressed. Azelaic Acid Pregnancy And Lactation Text: This medication is considered safe during pregnancy and breast feeding. Tazorac Pregnancy And Lactation Text: This medication is not safe during pregnancy. It is unknown if this medication is excreted in breast milk. Aklief Pregnancy And Lactation Text: It is unknown if this medication is safe to use during pregnancy.  It is unknown if this medication is excreted in breast milk.  Breastfeeding women should use the topical cream on the smallest area of the skin for the shortest time needed while breastfeeding.  Do not apply to nipple and areola. Winlevi Pregnancy And Lactation Text: This medication is considered safe during pregnancy and breastfeeding. Topical Retinoid Pregnancy And Lactation Text: This medication is Pregnancy Category C. It is unknown if this medication is excreted in breast milk. Benzoyl Peroxide Pregnancy And Lactation Text: This medication is Pregnancy Category C. It is unknown if benzoyl peroxide is excreted in breast milk. Topical Sulfur Applications Pregnancy And Lactation Text: This medication is Pregnancy Category C and has an unknown safety profile during pregnancy. It is unknown if this topical medication is excreted in breast milk. Topical Clindamycin Pregnancy And Lactation Text: This medication is Pregnancy Category B and is considered safe during pregnancy. It is unknown if it is excreted in breast milk. Isotretinoin Pregnancy And Lactation Text: This medication is Pregnancy Category X and is considered extremely dangerous during pregnancy. It is unknown if it is excreted in breast milk. Sarecycline Pregnancy And Lactation Text: This medication is Pregnancy Category D and not consider safe during pregnancy. It is also excreted in breast milk. Erythromycin Pregnancy And Lactation Text: This medication is Pregnancy Category B and is considered safe during pregnancy. It is also excreted in breast milk. High Dose Vitamin A Pregnancy And Lactation Text: High dose vitamin A therapy is contraindicated during pregnancy and breast feeding. Spironolactone Pregnancy And Lactation Text: This medication can cause feminization of the male fetus and should be avoided during pregnancy. The active metabolite is also found in breast milk. Bactrim Pregnancy And Lactation Text: This medication is Pregnancy Category D and is known to cause fetal risk.  It is also excreted in breast milk. Birth Control Pills Pregnancy And Lactation Text: This medication should be avoided if pregnant and for the first 30 days post-partum. Azithromycin Pregnancy And Lactation Text: This medication is considered safe during pregnancy and is also secreted in breast milk. Doxycycline Pregnancy And Lactation Text: This medication is Pregnancy Category D and not consider safe during pregnancy. It is also excreted in breast milk but is considered safe for shorter treatment courses. Dapsone Pregnancy And Lactation Text: This medication is Pregnancy Category C and is not considered safe during pregnancy or breast feeding. Detail Level: Zone Topical Clindamycin Counseling: Patient counseled that this medication may cause skin irritation or allergic reactions.  In the event of skin irritation, the patient was advised to reduce the amount of the drug applied or use it less frequently.   The patient verbalized understanding of the proper use and possible adverse effects of clindamycin.  All of the patient's questions and concerns were addressed. Winlevi Counseling:  I discussed with the patient the risks of topical clascoterone including but not limited to erythema, scaling, itching, and stinging. Patient voiced their understanding.